# Patient Record
Sex: MALE | Race: WHITE | HISPANIC OR LATINO | Employment: FULL TIME
[De-identification: names, ages, dates, MRNs, and addresses within clinical notes are randomized per-mention and may not be internally consistent; named-entity substitution may affect disease eponyms.]

---

## 2017-01-31 ENCOUNTER — CHARTING TRANS (OUTPATIENT)
Dept: OTHER | Age: 37
End: 2017-01-31

## 2017-02-04 ENCOUNTER — LAB SERVICES (OUTPATIENT)
Dept: OTHER | Age: 37
End: 2017-02-04

## 2017-02-09 ENCOUNTER — CHARTING TRANS (OUTPATIENT)
Dept: OTHER | Age: 37
End: 2017-02-09

## 2017-02-11 ENCOUNTER — CHARTING TRANS (OUTPATIENT)
Dept: OTHER | Age: 37
End: 2017-02-11

## 2017-02-11 LAB
ALBUMIN SERPL-MCNC: 3.7 G/DL (ref 3.6–5.1)
ALBUMIN/GLOB SERPL: 0.9 (ref 1–2.4)
ALP SERPL-CCNC: 92 UNITS/L (ref 45–117)
ALT SERPL-CCNC: 59 UNITS/L
ANION GAP SERPL CALC-SCNC: 12 MMOL/L (ref 10–20)
AST SERPL-CCNC: 36 UNITS/L
BILIRUB SERPL-MCNC: 0.3 MG/DL (ref 0.2–1)
BUN SERPL-MCNC: 12 MG/DL (ref 10–20)
BUN/CREAT SERPL: 14 (ref 7–25)
CALCIUM SERPL-MCNC: 9.2 MG/DL (ref 8.4–10.2)
CHLORIDE SERPL-SCNC: 106 MMOL/L (ref 98–107)
CHOLEST SERPL-MCNC: 142 MG/DL
CHOLEST/HDLC SERPL: 3.8
CO2 SERPL-SCNC: 30 MMOL/L (ref 21–32)
CREAT SERPL-MCNC: 0.84 MG/DL (ref 0.67–1.17)
GLOBULIN SER-MCNC: 4.1 G/DL (ref 2–4)
GLUCOSE SERPL-MCNC: 87 MG/DL (ref 65–99)
HBA1C MFR BLD: 5.5 % (ref 4.5–5.6)
HDLC SERPL-MCNC: 37 MG/DL
LDLC SERPL CALC-MCNC: 80 MG/DL
LENGTH OF FAST TIME PATIENT: ABNORMAL HRS
LENGTH OF FAST TIME PATIENT: ABNORMAL HRS
NONHDLC SERPL-MCNC: 105 MG/DL
POTASSIUM SERPL-SCNC: 4.7 MMOL/L (ref 3.4–5.1)
SODIUM SERPL-SCNC: 143 MMOL/L (ref 135–145)
TOTAL PROTEIN: 7.8 G/DL (ref 6.4–8.2)
TRIGL SERPL-MCNC: 127 MG/DL

## 2017-06-12 ENCOUNTER — LAB SERVICES (OUTPATIENT)
Dept: OTHER | Age: 37
End: 2017-06-12

## 2017-06-12 ENCOUNTER — IMAGING SERVICES (OUTPATIENT)
Dept: OTHER | Age: 37
End: 2017-06-12

## 2017-06-12 ENCOUNTER — HOSPITAL (OUTPATIENT)
Dept: OTHER | Age: 37
End: 2017-06-12
Attending: EMERGENCY MEDICINE

## 2017-06-12 LAB
ALBUMIN SERPL-MCNC: 3.7 G/DL (ref 3.6–5.1)
ALBUMIN SERPL-MCNC: 3.7 GM/DL (ref 3.6–5.1)
ALBUMIN/GLOB SERPL: 0.8 (ref 1–2.4)
ALBUMIN/GLOB SERPL: 0.8 {RATIO} (ref 1–2.4)
ALP SERPL-CCNC: 68 UNIT/L (ref 45–117)
ALP SERPL-CCNC: 68 UNITS/L (ref 45–117)
ALT SERPL-CCNC: 64 UNIT/L
ALT SERPL-CCNC: 64 UNITS/L
ANALYZER ANC (IANC): NORMAL
ANALYZER ANC (IANC): NORMAL
ANION GAP SERPL CALC-SCNC: 11 MMOL/L (ref 10–20)
ANION GAP SERPL CALC-SCNC: 11 MMOL/L (ref 10–20)
AST SERPL-CCNC: 42 UNIT/L
AST SERPL-CCNC: 42 UNITS/L
BASOPHILS # BLD: 0 K/MCL (ref 0–0.3)
BASOPHILS # BLD: 0 THOUSAND/MCL (ref 0–0.3)
BASOPHILS NFR BLD: 0 %
BASOPHILS NFR BLD: 0 %
BILIRUB SERPL-MCNC: 0.4 MG/DL (ref 0.2–1)
BILIRUB SERPL-MCNC: 0.4 MG/DL (ref 0.2–1)
BUN SERPL-MCNC: 12 MG/DL (ref 6–20)
BUN SERPL-MCNC: 12 MG/DL (ref 6–20)
BUN/CREAT SERPL: 17 (ref 7–25)
BUN/CREAT SERPL: 17 (ref 7–25)
CALCIUM SERPL-MCNC: 8.9 MG/DL (ref 8.4–10.2)
CALCIUM SERPL-MCNC: 8.9 MG/DL (ref 8.4–10.2)
CHLORIDE SERPL-SCNC: 104 MMOL/L (ref 98–107)
CHLORIDE: 104 MMOL/L (ref 98–107)
CO2 SERPL-SCNC: 29 MMOL/L (ref 21–32)
CO2 SERPL-SCNC: 29 MMOL/L (ref 21–32)
CREAT SERPL-MCNC: 0.69 MG/DL (ref 0.67–1.17)
CREAT SERPL-MCNC: 0.69 MG/DL (ref 0.67–1.17)
DIFFERENTIAL METHOD BLD: NORMAL
DIFFERENTIAL METHOD BLD: NORMAL
EOSINOPHIL # BLD: 0.1 K/MCL (ref 0.1–0.5)
EOSINOPHIL # BLD: 0.1 THOUSAND/MCL (ref 0.1–0.5)
EOSINOPHIL NFR BLD: 1 %
EOSINOPHIL NFR BLD: 1 %
ERYTHROCYTE [DISTWIDTH] IN BLOOD: 13.3 % (ref 11–15)
ERYTHROCYTE [DISTWIDTH] IN BLOOD: 13.3 % (ref 11–15)
GLOBULIN SER-MCNC: 4.6 G/DL (ref 2–4)
GLOBULIN SER-MCNC: 4.6 GM/DL (ref 2–4)
GLUCOSE SERPL-MCNC: 107 MG/DL (ref 65–99)
GLUCOSE SERPL-MCNC: 107 MG/DL (ref 65–99)
HEMATOCRIT: 44.2 % (ref 39–51)
HEMATOCRIT: 44.2 % (ref 39–51)
HEMOGLOBIN: 15.4 G/DL (ref 13–17)
HGB BLD-MCNC: 15.4 GM/DL (ref 13–17)
LYMPHOCYTES # BLD: 2.3 K/MCL (ref 1–4.8)
LYMPHOCYTES # BLD: 2.3 THOUSAND/MCL (ref 1–4.8)
LYMPHOCYTES NFR BLD: 31 %
LYMPHOCYTES NFR BLD: 31 %
MCH RBC QN AUTO: 29.6 PG (ref 26–34)
MCHC RBC AUTO-ENTMCNC: 34.8 GM/DL (ref 32–36.5)
MCV RBC AUTO: 84.8 FL (ref 78–100)
MEAN CORPUSCULAR HEMOGLOBIN: 29.6 PG (ref 26–34)
MEAN CORPUSCULAR HGB CONC: 34.8 G/DL (ref 32–36.5)
MEAN CORPUSCULAR VOLUME: 84.8 FL (ref 78–100)
MONOCYTES # BLD: 0.6 K/MCL (ref 0.3–0.9)
MONOCYTES # BLD: 0.6 THOUSAND/MCL (ref 0.3–0.9)
MONOCYTES NFR BLD: 8 %
MONOCYTES NFR BLD: 8 %
NEUTROPHILS # BLD: 4.6 K/MCL (ref 1.8–7.7)
NEUTROPHILS # BLD: 4.6 THOUSAND/MCL (ref 1.8–7.7)
NEUTROPHILS NFR BLD: 60 %
NEUTROPHILS NFR BLD: 60 %
NEUTS SEG NFR BLD: NORMAL
NEUTS SEG NFR BLD: NORMAL %
NRBC (NRBCRE): NORMAL
PERCENT NRBC: NORMAL
PLATELET # BLD: 273 THOUSAND/MCL (ref 140–450)
PLATELET COUNT: 273 K/MCL (ref 140–450)
POTASSIUM SERPL-SCNC: 3.7 MMOL/L (ref 3.4–5.1)
POTASSIUM SERPL-SCNC: 3.7 MMOL/L (ref 3.4–5.1)
PROT SERPL-MCNC: 8.3 GM/DL (ref 6.4–8.2)
RBC # BLD: 5.21 MILLION/MCL (ref 4.5–5.9)
RED CELL COUNT: 5.21 MIL/MCL (ref 4.5–5.9)
SODIUM SERPL-SCNC: 140 MMOL/L (ref 135–145)
SODIUM SERPL-SCNC: 140 MMOL/L (ref 135–145)
TOTAL PROTEIN: 8.3 G/DL (ref 6.4–8.2)
TROPONIN I SERPL HS-MCNC: <0.02 NG/ML
TROPONIN I SERPL HS-MCNC: <0.02 NG/ML
WBC # BLD: 7.6 THOUSAND/MCL (ref 4.2–11)
WHITE BLOOD COUNT: 7.6 K/MCL (ref 4.2–11)

## 2017-08-05 ENCOUNTER — IMAGING SERVICES (OUTPATIENT)
Dept: OTHER | Age: 37
End: 2017-08-05

## 2017-08-05 ENCOUNTER — CHARTING TRANS (OUTPATIENT)
Dept: OTHER | Age: 37
End: 2017-08-05

## 2017-09-20 ENCOUNTER — CHARTING TRANS (OUTPATIENT)
Dept: OTHER | Age: 37
End: 2017-09-20

## 2017-09-22 ENCOUNTER — CHARTING TRANS (OUTPATIENT)
Dept: OTHER | Age: 37
End: 2017-09-22

## 2018-02-18 ENCOUNTER — HOSPITAL (OUTPATIENT)
Dept: OTHER | Age: 38
End: 2018-02-18
Attending: EMERGENCY MEDICINE

## 2018-02-18 ENCOUNTER — LAB SERVICES (OUTPATIENT)
Dept: OTHER | Age: 38
End: 2018-02-18

## 2018-02-18 ENCOUNTER — IMAGING SERVICES (OUTPATIENT)
Dept: OTHER | Age: 38
End: 2018-02-18

## 2018-02-18 LAB
ANALYZER ANC (IANC): NORMAL
ANALYZER ANC (IANC): NORMAL
ANION GAP SERPL CALC-SCNC: 13 MMOL/L (ref 10–20)
ANION GAP SERPL CALC-SCNC: 13 MMOL/L (ref 10–20)
BASOPHILS # BLD: 0 K/MCL (ref 0–0.3)
BASOPHILS # BLD: 0 THOUSAND/MCL (ref 0–0.3)
BASOPHILS NFR BLD: 0 %
BASOPHILS NFR BLD: 0 %
BUN SERPL-MCNC: 13 MG/DL (ref 6–20)
BUN SERPL-MCNC: 13 MG/DL (ref 6–20)
BUN/CREAT SERPL: 15 (ref 7–25)
BUN/CREAT SERPL: 15 (ref 7–25)
CALCIUM SERPL-MCNC: 9.5 MG/DL (ref 8.4–10.2)
CALCIUM SERPL-MCNC: 9.5 MG/DL (ref 8.4–10.2)
CHLORIDE SERPL-SCNC: 103 MMOL/L (ref 98–107)
CHLORIDE: 103 MMOL/L (ref 98–107)
CO2 SERPL-SCNC: 27 MMOL/L (ref 21–32)
CO2 SERPL-SCNC: 27 MMOL/L (ref 21–32)
CREAT SERPL-MCNC: 0.86 MG/DL (ref 0.67–1.17)
CREAT SERPL-MCNC: 0.86 MG/DL (ref 0.67–1.17)
DIFFERENTIAL METHOD BLD: NORMAL
DIFFERENTIAL METHOD BLD: NORMAL
EOSINOPHIL # BLD: 0.1 K/MCL (ref 0.1–0.5)
EOSINOPHIL # BLD: 0.1 THOUSAND/MCL (ref 0.1–0.5)
EOSINOPHIL NFR BLD: 1 %
EOSINOPHIL NFR BLD: 1 %
ERYTHROCYTE [DISTWIDTH] IN BLOOD: 13.3 % (ref 11–15)
ERYTHROCYTE [DISTWIDTH] IN BLOOD: 13.3 % (ref 11–15)
GLUCOSE SERPL-MCNC: 112 MG/DL (ref 65–99)
GLUCOSE SERPL-MCNC: 112 MG/DL (ref 65–99)
HEMATOCRIT: 44.9 % (ref 39–51)
HEMATOCRIT: 44.9 % (ref 39–51)
HEMOGLOBIN: 15.8 G/DL (ref 13–17)
HGB BLD-MCNC: 15.8 GM/DL (ref 13–17)
LYMPHOCYTES # BLD: 2 K/MCL (ref 1–4.8)
LYMPHOCYTES # BLD: 2 THOUSAND/MCL (ref 1–4.8)
LYMPHOCYTES NFR BLD: 23 %
LYMPHOCYTES NFR BLD: 23 %
MCH RBC QN AUTO: 29.6 PG (ref 26–34)
MCHC RBC AUTO-ENTMCNC: 35.2 GM/DL (ref 32–36.5)
MCV RBC AUTO: 84.1 FL (ref 78–100)
MEAN CORPUSCULAR HEMOGLOBIN: 29.6 PG (ref 26–34)
MEAN CORPUSCULAR HGB CONC: 35.2 G/DL (ref 32–36.5)
MEAN CORPUSCULAR VOLUME: 84.1 FL (ref 78–100)
MONOCYTES # BLD: 0.6 K/MCL (ref 0.3–0.9)
MONOCYTES # BLD: 0.6 THOUSAND/MCL (ref 0.3–0.9)
MONOCYTES NFR BLD: 6 %
MONOCYTES NFR BLD: 6 %
NEUTROPHILS # BLD: 6.4 K/MCL (ref 1.8–7.7)
NEUTROPHILS # BLD: 6.4 THOUSAND/MCL (ref 1.8–7.7)
NEUTROPHILS NFR BLD: 70 %
NEUTROPHILS NFR BLD: 70 %
NEUTS SEG NFR BLD: NORMAL
NEUTS SEG NFR BLD: NORMAL %
NRBC (NRBCRE): NORMAL
PERCENT NRBC: NORMAL
PLATELET # BLD: 278 THOUSAND/MCL (ref 140–450)
PLATELET COUNT: 278 K/MCL (ref 140–450)
POTASSIUM SERPL-SCNC: 3.4 MMOL/L (ref 3.4–5.1)
POTASSIUM SERPL-SCNC: 3.4 MMOL/L (ref 3.4–5.1)
RBC # BLD: 5.34 MILLION/MCL (ref 4.5–5.9)
RED CELL COUNT: 5.34 MIL/MCL (ref 4.5–5.9)
SODIUM SERPL-SCNC: 140 MMOL/L (ref 135–145)
SODIUM SERPL-SCNC: 140 MMOL/L (ref 135–145)
TROPONIN I SERPL HS-MCNC: <0.02 NG/ML
TROPONIN I SERPL HS-MCNC: <0.02 NG/ML
WBC # BLD: 9.1 THOUSAND/MCL (ref 4.2–11)
WHITE BLOOD COUNT: 9.1 K/MCL (ref 4.2–11)

## 2018-02-19 ENCOUNTER — CHARTING TRANS (OUTPATIENT)
Dept: OTHER | Age: 38
End: 2018-02-19

## 2018-02-27 ENCOUNTER — CHARTING TRANS (OUTPATIENT)
Dept: OTHER | Age: 38
End: 2018-02-27

## 2018-02-27 ENCOUNTER — LAB SERVICES (OUTPATIENT)
Dept: OTHER | Age: 38
End: 2018-02-27

## 2018-03-17 LAB
ALBUMIN SERPL-MCNC: 4.3 G/DL (ref 3.6–5.1)
ALP SERPL-CCNC: 77 UNITS/L (ref 45–117)
ALT SERPL-CCNC: 71 UNITS/L
AST SERPL-CCNC: 39 UNITS/L
BILIRUB CONJ SERPL-MCNC: 0.2 MG/DL (ref 0–0.2)
BILIRUB SERPL-MCNC: 0.5 MG/DL (ref 0.2–1)
CHOLEST SERPL-MCNC: 144 MG/DL
CHOLEST/HDLC SERPL: 4.1
HDLC SERPL-MCNC: 35 MG/DL
LDLC SERPL CALC-MCNC: 69 MG/DL
LENGTH OF FAST TIME PATIENT: ABNORMAL HRS
NONHDLC SERPL-MCNC: 109 MG/DL
TOTAL PROTEIN: 8.3 G/DL (ref 6.4–8.2)
TRIGL SERPL-MCNC: 199 MG/DL

## 2018-03-24 ENCOUNTER — CHARTING TRANS (OUTPATIENT)
Dept: OTHER | Age: 38
End: 2018-03-24

## 2018-07-09 ENCOUNTER — CHARTING TRANS (OUTPATIENT)
Dept: OTHER | Age: 38
End: 2018-07-09

## 2018-07-09 ENCOUNTER — LAB SERVICES (OUTPATIENT)
Dept: OTHER | Age: 38
End: 2018-07-09

## 2018-07-09 ENCOUNTER — HOSPITAL (OUTPATIENT)
Dept: OTHER | Age: 38
End: 2018-07-09
Attending: INTERNAL MEDICINE

## 2018-07-09 ENCOUNTER — IMAGING SERVICES (OUTPATIENT)
Dept: OTHER | Age: 38
End: 2018-07-09

## 2018-07-09 LAB
ALBUMIN SERPL-MCNC: 3.5 G/DL (ref 3.6–5.1)
ALBUMIN SERPL-MCNC: 3.5 GM/DL (ref 3.6–5.1)
ALBUMIN/GLOB SERPL: 0.7 (ref 1–2.4)
ALBUMIN/GLOB SERPL: 0.7 {RATIO} (ref 1–2.4)
ALP SERPL-CCNC: 70 UNIT/L (ref 45–117)
ALP SERPL-CCNC: 70 UNITS/L (ref 45–117)
ALT SERPL-CCNC: 39 UNIT/L
ALT SERPL-CCNC: 39 UNITS/L
AMORPH SED URNS QL MICRO: NORMAL
AMORPH SED URNS QL MICRO: NORMAL
ANALYZER ANC (IANC): ABNORMAL
ANALYZER ANC (IANC): ABNORMAL
ANION GAP SERPL CALC-SCNC: 13 MMOL/L (ref 10–20)
ANION GAP SERPL CALC-SCNC: 13 MMOL/L (ref 10–20)
APPEARANCE UR: CLEAR
AST SERPL-CCNC: 23 UNIT/L
AST SERPL-CCNC: 23 UNITS/L
BASOPHILS # BLD: 0 K/MCL (ref 0–0.3)
BASOPHILS # BLD: 0 THOUSAND/MCL (ref 0–0.3)
BASOPHILS NFR BLD: 0 %
BASOPHILS NFR BLD: 0 %
BILIRUB SERPL-MCNC: 0.5 MG/DL (ref 0.2–1)
BILIRUB SERPL-MCNC: 0.5 MG/DL (ref 0.2–1)
BILIRUB UR QL STRIP: NEGATIVE
BILIRUB UR QL STRIP: NEGATIVE
BILIRUB UR QL: NEGATIVE
BILIRUB UR QL: NEGATIVE
BUN SERPL-MCNC: 10 MG/DL (ref 6–20)
BUN SERPL-MCNC: 10 MG/DL (ref 6–20)
BUN/CREAT SERPL: 12 (ref 7–25)
BUN/CREAT SERPL: 12 (ref 7–25)
CALCIUM SERPL-MCNC: 9 MG/DL (ref 8.4–10.2)
CALCIUM SERPL-MCNC: 9 MG/DL (ref 8.4–10.2)
CAOX CRY URNS QL MICRO: NORMAL
CAOX CRY URNS QL MICRO: NORMAL
CHLORIDE SERPL-SCNC: 101 MMOL/L (ref 98–107)
CHLORIDE: 101 MMOL/L (ref 98–107)
CO2 SERPL-SCNC: 25 MMOL/L (ref 21–32)
CO2 SERPL-SCNC: 25 MMOL/L (ref 21–32)
COLOR UR: YELLOW
CREAT SERPL-MCNC: 0.85 MG/DL (ref 0.67–1.17)
CREAT SERPL-MCNC: 0.85 MG/DL (ref 0.67–1.17)
DIFFERENTIAL METHOD BLD: ABNORMAL
DIFFERENTIAL METHOD BLD: ABNORMAL
EOSINOPHIL # BLD: 0.2 K/MCL (ref 0.1–0.5)
EOSINOPHIL # BLD: 0.2 THOUSAND/MCL (ref 0.1–0.5)
EOSINOPHIL NFR BLD: 1 %
EOSINOPHIL NFR BLD: 1 %
EPITH CASTS #/AREA URNS LPF: NORMAL
EPITH CASTS #/AREA URNS LPF: NORMAL /[LPF]
ERYTHROCYTE [DISTWIDTH] IN BLOOD: 12.6 % (ref 11–15)
ERYTHROCYTE [DISTWIDTH] IN BLOOD: 12.6 % (ref 11–15)
FATTY CASTS #/AREA URNS LPF: NORMAL
FATTY CASTS #/AREA URNS LPF: NORMAL /[LPF]
GLOBULIN SER-MCNC: 4.8 G/DL (ref 2–4)
GLOBULIN SER-MCNC: 4.8 GM/DL (ref 2–4)
GLUCOSE SERPL-MCNC: 98 MG/DL (ref 65–99)
GLUCOSE SERPL-MCNC: 98 MG/DL (ref 65–99)
GLUCOSE UR STRIP-MCNC: NEGATIVE MG/DL
GLUCOSE UR STRIP-MCNC: NEGATIVE MG/DL
GLUCOSE UR-MCNC: NEGATIVE MG/DL
GLUCOSE UR-MCNC: NEGATIVE MG/DL
GRAN CASTS #/AREA URNS LPF: NORMAL
GRAN CASTS #/AREA URNS LPF: NORMAL /[LPF]
HEMATOCRIT: 41.4 % (ref 39–51)
HEMATOCRIT: 41.4 % (ref 39–51)
HEMOCCULT STL QL: NEGATIVE
HEMOCCULT STL QL: NEGATIVE
HEMOGLOBIN: 14.6 G/DL (ref 13–17)
HGB BLD-MCNC: 14.6 GM/DL (ref 13–17)
HGB UR QL: NEGATIVE
HYALINE CASTS #/AREA URNS LPF: NORMAL
HYALINE CASTS #/AREA URNS LPF: NORMAL /[LPF]
KETONES UR STRIP-MCNC: NEGATIVE MG/DL
KETONES UR STRIP-MCNC: NEGATIVE MG/DL
KETONES UR-MCNC: NEGATIVE MG/DL
KETONES UR-MCNC: NEGATIVE MG/DL
LEUKOCYTE ESTERASE UR QL STRIP: NEGATIVE
LIPASE SERPL-CCNC: 83 UNIT/L (ref 73–393)
LIPASE SERPL-CCNC: 83 UNITS/L (ref 73–393)
LYMPHOCYTES # BLD: 2.4 K/MCL (ref 1–4.8)
LYMPHOCYTES # BLD: 2.4 THOUSAND/MCL (ref 1–4.8)
LYMPHOCYTES NFR BLD: 19 %
LYMPHOCYTES NFR BLD: 19 %
MCH RBC QN AUTO: 29.8 PG (ref 26–34)
MCHC RBC AUTO-ENTMCNC: 35.3 GM/DL (ref 32–36.5)
MCV RBC AUTO: 84.5 FL (ref 78–100)
MEAN CORPUSCULAR HEMOGLOBIN: 29.8 PG (ref 26–34)
MEAN CORPUSCULAR HGB CONC: 35.3 G/DL (ref 32–36.5)
MEAN CORPUSCULAR VOLUME: 84.5 FL (ref 78–100)
MICROSCOPIC (MT): NORMAL
MICROSCOPIC (MT): NORMAL
MIXED CELL CASTS #/AREA URNS LPF: NORMAL
MIXED CELL CASTS #/AREA URNS LPF: NORMAL /[LPF]
MONOCYTES # BLD: 1.3 K/MCL (ref 0.3–0.9)
MONOCYTES # BLD: 1.3 THOUSAND/MCL (ref 0.3–0.9)
MONOCYTES NFR BLD: 11 %
MONOCYTES NFR BLD: 11 %
MUCOUS THREADS URNS QL MICRO: NORMAL
MUCOUS THREADS URNS QL MICRO: NORMAL
NEUTROPHILS # BLD: 8.6 K/MCL (ref 1.8–7.7)
NEUTROPHILS # BLD: 8.6 THOUSAND/MCL (ref 1.8–7.7)
NEUTROPHILS NFR BLD: 69 %
NEUTROPHILS NFR BLD: 69 %
NEUTS SEG NFR BLD: ABNORMAL
NEUTS SEG NFR BLD: ABNORMAL %
NITRITE UR QL STRIP: NEGATIVE
NITRITE UR QL STRIP: NEGATIVE
NITRITE UR QL: NEGATIVE
NITRITE UR QL: NEGATIVE
NRBC (NRBCRE): ABNORMAL
NRBC (NRBCRE): ABNORMAL
PH UR STRIP: 7 UNIT (ref 5–7)
PH UR STRIP: 7 UNITS (ref 5–7)
PH UR: 6 UNIT (ref 5–7)
PH UR: 6 UNITS (ref 5–7)
PLATELET # BLD: 325 THOUSAND/MCL (ref 140–450)
PLATELET COUNT: 325 K/MCL (ref 140–450)
POTASSIUM SERPL-SCNC: 3.8 MMOL/L (ref 3.4–5.1)
POTASSIUM SERPL-SCNC: 3.8 MMOL/L (ref 3.4–5.1)
PROT SERPL-MCNC: 8.3 GM/DL (ref 6.4–8.2)
PROT UR QL: NEGATIVE MG/DL
PROT UR QL: NEGATIVE MG/DL
PROT UR STRIP-MCNC: NEGATIVE MG/DL
PROT UR STRIP-MCNC: NEGATIVE MG/DL
RBC # BLD: 4.9 MILLION/MCL (ref 4.5–5.9)
RBC CASTS #/AREA URNS LPF: NORMAL
RBC CASTS #/AREA URNS LPF: NORMAL /[LPF]
RBC-URINE: NEGATIVE
RED CELL COUNT: 4.9 MIL/MCL (ref 4.5–5.9)
RENAL EPI CELLS #/AREA URNS HPF: NORMAL
RENAL EPI CELLS #/AREA URNS HPF: NORMAL /[HPF]
SODIUM SERPL-SCNC: 135 MMOL/L (ref 135–145)
SODIUM SERPL-SCNC: 135 MMOL/L (ref 135–145)
SP GR UR STRIP: 1.02 (ref 1–1.03)
SP GR UR STRIP: 1.02 (ref 1–1.03)
SP GR UR: 1.02 (ref 1–1.03)
SP GR UR: 1.02 (ref 1–1.03)
SPECIMEN SOURCE: NORMAL
SPECIMEN SOURCE: NORMAL
SPERM URNS QL MICRO: NORMAL
SPERM URNS QL MICRO: NORMAL
T VAGINALIS URNS QL MICRO: NORMAL
T VAGINALIS URNS QL MICRO: NORMAL
TOTAL PROTEIN: 8.3 G/DL (ref 6.4–8.2)
TRI-PHOS CRY URNS QL MICRO: NORMAL
TRI-PHOS CRY URNS QL MICRO: NORMAL
URATE CRY URNS QL MICRO: NORMAL
URATE CRY URNS QL MICRO: NORMAL
URNS CMNT MICRO: NORMAL
URNS CMNT MICRO: NORMAL
UROBILINOGEN UR QL: 0.2 MG/DL (ref 0–1)
UROBILINOGEN UR QL: 0.2 MG/DL (ref 0–1)
UROBILINOGEN UR STRIP-MCNC: 1 MG/DL (ref 0–1)
UROBILINOGEN UR STRIP-MCNC: 1 MG/DL (ref 0–1)
WAXY CASTS #/AREA URNS LPF: NORMAL
WAXY CASTS #/AREA URNS LPF: NORMAL /[LPF]
WBC # BLD: 12.5 THOUSAND/MCL (ref 4.2–11)
WBC CASTS #/AREA URNS LPF: NORMAL
WBC CASTS #/AREA URNS LPF: NORMAL /[LPF]
WBC-URINE: NEGATIVE
WHITE BLOOD COUNT: 12.5 K/MCL (ref 4.2–11)
YEAST HYPHAE URNS QL MICRO: NORMAL
YEAST HYPHAE URNS QL MICRO: NORMAL
YEAST URNS QL MICRO: NORMAL
YEAST URNS QL MICRO: NORMAL

## 2018-07-10 LAB
ANALYZER ANC (IANC): ABNORMAL
APTT PPP: 30 SECONDS (ref 22–30)
APTT PPP: NORMAL S
BASOPHILS # BLD: 0 THOUSAND/MCL (ref 0–0.3)
BASOPHILS NFR BLD: 0 %
CRP SERPL-MCNC: 8.3 MG/DL
DIFFERENTIAL METHOD BLD: ABNORMAL
EOSINOPHIL # BLD: 0.2 THOUSAND/MCL (ref 0.1–0.5)
EOSINOPHIL NFR BLD: 2 %
ERYTHROCYTE [DISTWIDTH] IN BLOOD: 12.8 % (ref 11–15)
HEMATOCRIT: 39.7 % (ref 39–51)
HGB BLD-MCNC: 13.7 GM/DL (ref 13–17)
INR PPP: 1.1
LYMPHOCYTES # BLD: 1.9 THOUSAND/MCL (ref 1–4.8)
LYMPHOCYTES NFR BLD: 19 %
MCH RBC QN AUTO: 29.6 PG (ref 26–34)
MCHC RBC AUTO-ENTMCNC: 34.5 GM/DL (ref 32–36.5)
MCV RBC AUTO: 85.7 FL (ref 78–100)
MONOCYTES # BLD: 1 THOUSAND/MCL (ref 0.3–0.9)
MONOCYTES NFR BLD: 10 %
NEUTROPHILS # BLD: 6.7 THOUSAND/MCL (ref 1.8–7.7)
NEUTROPHILS NFR BLD: 69 %
NEUTS SEG NFR BLD: ABNORMAL %
NRBC (NRBCRE): ABNORMAL
PLATELET # BLD: 283 THOUSAND/MCL (ref 140–450)
PROTHROMBIN TIME: 11.4 SECONDS (ref 9.7–11.8)
PROTHROMBIN TIME: NORMAL
RBC # BLD: 4.63 MILLION/MCL (ref 4.5–5.9)
WBC # BLD: 9.7 THOUSAND/MCL (ref 4.2–11)

## 2018-07-11 LAB
ANALYZER ANC (IANC): NORMAL
ANION GAP SERPL CALC-SCNC: 13 MMOL/L (ref 10–20)
BASOPHILS # BLD: 0 THOUSAND/MCL (ref 0–0.3)
BASOPHILS NFR BLD: 0 %
BUN SERPL-MCNC: 9 MG/DL (ref 6–20)
BUN/CREAT SERPL: 11 (ref 7–25)
CALCIUM SERPL-MCNC: 8.6 MG/DL (ref 8.4–10.2)
CHLORIDE: 111 MMOL/L (ref 98–107)
CO2 SERPL-SCNC: 27 MMOL/L (ref 21–32)
CREAT SERPL-MCNC: 0.85 MG/DL (ref 0.67–1.17)
CRP SERPL-MCNC: 7.7 MG/DL
DIFFERENTIAL METHOD BLD: NORMAL
EOSINOPHIL # BLD: 0.2 THOUSAND/MCL (ref 0.1–0.5)
EOSINOPHIL NFR BLD: 2 %
ERYTHROCYTE [DISTWIDTH] IN BLOOD: 12.7 % (ref 11–15)
GLUCOSE SERPL-MCNC: 101 MG/DL (ref 65–99)
HEMATOCRIT: 40.3 % (ref 39–51)
HGB BLD-MCNC: 14 GM/DL (ref 13–17)
LYMPHOCYTES # BLD: 2 THOUSAND/MCL (ref 1–4.8)
LYMPHOCYTES NFR BLD: 24 %
MAGNESIUM SERPL-MCNC: 2.1 MG/DL (ref 1.7–2.4)
MCH RBC QN AUTO: 29.4 PG (ref 26–34)
MCHC RBC AUTO-ENTMCNC: 34.7 GM/DL (ref 32–36.5)
MCV RBC AUTO: 84.7 FL (ref 78–100)
MONOCYTES # BLD: 0.8 THOUSAND/MCL (ref 0.3–0.9)
MONOCYTES NFR BLD: 10 %
NEUTROPHILS # BLD: 5.4 THOUSAND/MCL (ref 1.8–7.7)
NEUTROPHILS NFR BLD: 64 %
NEUTS SEG NFR BLD: NORMAL %
NRBC (NRBCRE): NORMAL
PHOSPHATE SERPL-MCNC: 3.9 MG/DL (ref 2.4–4.7)
PLATELET # BLD: 284 THOUSAND/MCL (ref 140–450)
POTASSIUM SERPL-SCNC: 4 MMOL/L (ref 3.4–5.1)
RBC # BLD: 4.76 MILLION/MCL (ref 4.5–5.9)
SODIUM SERPL-SCNC: 147 MMOL/L (ref 135–145)
WBC # BLD: 8.5 THOUSAND/MCL (ref 4.2–11)

## 2018-07-13 ENCOUNTER — CHARTING TRANS (OUTPATIENT)
Dept: OTHER | Age: 38
End: 2018-07-13

## 2018-07-16 ENCOUNTER — CHARTING TRANS (OUTPATIENT)
Dept: OTHER | Age: 38
End: 2018-07-16

## 2018-07-27 ENCOUNTER — CHARTING TRANS (OUTPATIENT)
Dept: OTHER | Age: 38
End: 2018-07-27

## 2018-07-27 ENCOUNTER — HOSPITAL (OUTPATIENT)
Dept: OTHER | Age: 38
End: 2018-07-27
Attending: SURGERY

## 2018-08-18 ENCOUNTER — IMAGING SERVICES (OUTPATIENT)
Dept: OTHER | Age: 38
End: 2018-08-18

## 2018-08-18 ENCOUNTER — HOSPITAL (OUTPATIENT)
Dept: OTHER | Age: 38
End: 2018-08-18

## 2018-09-07 ENCOUNTER — CHARTING TRANS (OUTPATIENT)
Dept: OTHER | Age: 38
End: 2018-09-07

## 2018-09-09 ENCOUNTER — CHARTING TRANS (OUTPATIENT)
Dept: OTHER | Age: 38
End: 2018-09-09

## 2018-09-10 ENCOUNTER — HOSPITAL (OUTPATIENT)
Dept: OTHER | Age: 38
End: 2018-09-10
Attending: SURGERY

## 2018-09-17 ENCOUNTER — CHARTING TRANS (OUTPATIENT)
Dept: OTHER | Age: 38
End: 2018-09-17

## 2018-09-17 ASSESSMENT — PAIN SCALES - GENERAL: PAINLEVEL_OUTOF10: 8

## 2018-09-18 ENCOUNTER — CHARTING TRANS (OUTPATIENT)
Dept: OTHER | Age: 38
End: 2018-09-18

## 2018-09-18 ENCOUNTER — HOSPITAL (OUTPATIENT)
Dept: OTHER | Age: 38
End: 2018-09-18
Attending: SURGERY

## 2018-10-31 VITALS
BODY MASS INDEX: 47.74 KG/M2 | WEIGHT: 315 LBS | RESPIRATION RATE: 20 BRPM | OXYGEN SATURATION: 96 % | HEIGHT: 68 IN | HEART RATE: 75 BPM | TEMPERATURE: 96.9 F

## 2018-10-31 VITALS
TEMPERATURE: 98.1 F | BODY MASS INDEX: 47.74 KG/M2 | OXYGEN SATURATION: 99 % | SYSTOLIC BLOOD PRESSURE: 115 MMHG | HEIGHT: 68 IN | WEIGHT: 315 LBS | DIASTOLIC BLOOD PRESSURE: 76 MMHG | HEART RATE: 65 BPM | RESPIRATION RATE: 18 BRPM

## 2018-11-01 VITALS
TEMPERATURE: 96.7 F | OXYGEN SATURATION: 97 % | HEIGHT: 68 IN | WEIGHT: 315 LBS | BODY MASS INDEX: 47.74 KG/M2 | HEART RATE: 60 BPM | RESPIRATION RATE: 19 BRPM

## 2018-11-01 VITALS
RESPIRATION RATE: 18 BRPM | HEART RATE: 68 BPM | TEMPERATURE: 97.6 F | BODY MASS INDEX: 47.74 KG/M2 | HEIGHT: 68 IN | WEIGHT: 315 LBS | OXYGEN SATURATION: 98 %

## 2018-11-02 VITALS
BODY MASS INDEX: 47.74 KG/M2 | RESPIRATION RATE: 20 BRPM | HEART RATE: 75 BPM | HEIGHT: 68 IN | OXYGEN SATURATION: 96 % | TEMPERATURE: 96 F | WEIGHT: 315 LBS

## 2018-11-03 VITALS
BODY MASS INDEX: 47.74 KG/M2 | HEART RATE: 68 BPM | SYSTOLIC BLOOD PRESSURE: 104 MMHG | OXYGEN SATURATION: 96 % | WEIGHT: 315 LBS | DIASTOLIC BLOOD PRESSURE: 66 MMHG | TEMPERATURE: 99.1 F | HEIGHT: 68 IN | RESPIRATION RATE: 20 BRPM

## 2018-11-05 VITALS
BODY MASS INDEX: 47.74 KG/M2 | TEMPERATURE: 96.5 F | RESPIRATION RATE: 20 BRPM | HEIGHT: 68 IN | WEIGHT: 315 LBS | OXYGEN SATURATION: 97 % | HEART RATE: 65 BPM

## 2018-11-05 VITALS
WEIGHT: 315 LBS | RESPIRATION RATE: 18 BRPM | OXYGEN SATURATION: 99 % | HEIGHT: 68 IN | HEART RATE: 65 BPM | TEMPERATURE: 97 F | BODY MASS INDEX: 47.74 KG/M2

## 2018-11-27 VITALS
SYSTOLIC BLOOD PRESSURE: 102 MMHG | HEART RATE: 57 BPM | RESPIRATION RATE: 17 BRPM | BODY MASS INDEX: 46.83 KG/M2 | DIASTOLIC BLOOD PRESSURE: 62 MMHG | WEIGHT: 309 LBS | HEIGHT: 68 IN | OXYGEN SATURATION: 96 % | TEMPERATURE: 98.2 F

## 2018-11-27 VITALS
OXYGEN SATURATION: 99 % | SYSTOLIC BLOOD PRESSURE: 124 MMHG | BODY MASS INDEX: 47.29 KG/M2 | DIASTOLIC BLOOD PRESSURE: 66 MMHG | HEART RATE: 75 BPM | TEMPERATURE: 96.7 F | WEIGHT: 311 LBS | RESPIRATION RATE: 18 BRPM

## 2019-03-15 ENCOUNTER — HOSPITAL (OUTPATIENT)
Dept: OTHER | Age: 39
End: 2019-03-15
Attending: SURGERY

## 2019-04-12 ENCOUNTER — TELEPHONE (OUTPATIENT)
Dept: SCHEDULING | Age: 39
End: 2019-04-12

## 2019-04-17 ENCOUNTER — TELEPHONE (OUTPATIENT)
Dept: SCHEDULING | Age: 39
End: 2019-04-17

## 2019-04-24 ENCOUNTER — OFFICE VISIT (OUTPATIENT)
Dept: INTERNAL MEDICINE | Age: 39
End: 2019-04-24

## 2019-04-24 ENCOUNTER — LAB SERVICES (OUTPATIENT)
Dept: LAB | Age: 39
End: 2019-04-24

## 2019-04-24 VITALS
SYSTOLIC BLOOD PRESSURE: 117 MMHG | TEMPERATURE: 97.3 F | RESPIRATION RATE: 18 BRPM | WEIGHT: 315 LBS | DIASTOLIC BLOOD PRESSURE: 79 MMHG | HEIGHT: 68 IN | OXYGEN SATURATION: 96 % | HEART RATE: 74 BPM | BODY MASS INDEX: 47.74 KG/M2

## 2019-04-24 DIAGNOSIS — Z00.00 ANNUAL PHYSICAL EXAM: Primary | ICD-10-CM

## 2019-04-24 DIAGNOSIS — B35.6 TINEA CRURIS: ICD-10-CM

## 2019-04-24 DIAGNOSIS — Z00.00 ANNUAL PHYSICAL EXAM: ICD-10-CM

## 2019-04-24 DIAGNOSIS — E66.01 MORBID OBESITY (CMD): ICD-10-CM

## 2019-04-24 LAB
ALBUMIN SERPL-MCNC: 3.8 G/DL (ref 3.6–5.1)
ALBUMIN/GLOB SERPL: 0.9 {RATIO} (ref 1–2.4)
ALP SERPL-CCNC: 75 UNITS/L (ref 45–117)
ALT SERPL-CCNC: 73 UNITS/L
ANION GAP SERPL CALC-SCNC: 11 MMOL/L (ref 10–20)
AST SERPL-CCNC: 39 UNITS/L
BILIRUB SERPL-MCNC: 0.4 MG/DL (ref 0.2–1)
BUN SERPL-MCNC: 8 MG/DL (ref 6–20)
BUN/CREAT SERPL: 10 (ref 7–25)
CALCIUM SERPL-MCNC: 9.3 MG/DL (ref 8.4–10.2)
CHLORIDE SERPL-SCNC: 106 MMOL/L (ref 98–107)
CHOLEST SERPL-MCNC: 159 MG/DL
CHOLEST/HDLC SERPL: 4.5 {RATIO}
CO2 SERPL-SCNC: 28 MMOL/L (ref 21–32)
CREAT SERPL-MCNC: 0.78 MG/DL (ref 0.67–1.17)
FASTING STATUS PATIENT QL REPORTED: 12 HRS
GLOBULIN SER-MCNC: 4.1 G/DL (ref 2–4)
GLUCOSE SERPL-MCNC: 92 MG/DL (ref 65–99)
HDLC SERPL-MCNC: 35 MG/DL
LDLC SERPL-MCNC: 82 MG/DL
LENGTH OF FAST TIME PATIENT: 12 HRS
NONHDLC SERPL-MCNC: 124 MG/DL
POTASSIUM SERPL-SCNC: 4.4 MMOL/L (ref 3.4–5.1)
PROT SERPL-MCNC: 7.9 G/DL (ref 6.4–8.2)
SODIUM SERPL-SCNC: 141 MMOL/L (ref 135–145)
TRIGL SERPL-MCNC: 212 MG/DL

## 2019-04-24 PROCEDURE — 99395 PREV VISIT EST AGE 18-39: CPT | Performed by: INTERNAL MEDICINE

## 2019-04-24 PROCEDURE — 80053 COMPREHEN METABOLIC PANEL: CPT | Performed by: INTERNAL MEDICINE

## 2019-04-24 PROCEDURE — 36415 COLL VENOUS BLD VENIPUNCTURE: CPT | Performed by: INTERNAL MEDICINE

## 2019-04-24 PROCEDURE — 80061 LIPID PANEL: CPT | Performed by: INTERNAL MEDICINE

## 2019-04-24 RX ORDER — CLOTRIMAZOLE AND BETAMETHASONE DIPROPIONATE 10; .64 MG/G; MG/G
CREAM TOPICAL 2 TIMES DAILY
Qty: 30 G | Refills: 0 | Status: SHIPPED | OUTPATIENT
Start: 2019-04-24 | End: 2022-09-13

## 2019-04-24 ASSESSMENT — PATIENT HEALTH QUESTIONNAIRE - PHQ9
1. LITTLE INTEREST OR PLEASURE IN DOING THINGS: NOT AT ALL
SUM OF ALL RESPONSES TO PHQ9 QUESTIONS 1 AND 2: 0
2. FEELING DOWN, DEPRESSED OR HOPELESS: NOT AT ALL
SUM OF ALL RESPONSES TO PHQ9 QUESTIONS 1 AND 2: 0

## 2019-05-06 ENCOUNTER — TELEPHONE (OUTPATIENT)
Dept: SCHEDULING | Age: 39
End: 2019-05-06

## 2019-05-09 ENCOUNTER — TELEPHONE (OUTPATIENT)
Dept: SCHEDULING | Age: 39
End: 2019-05-09

## 2019-05-09 ENCOUNTER — APPOINTMENT (OUTPATIENT)
Dept: GENERAL RADIOLOGY | Facility: HOSPITAL | Age: 39
End: 2019-05-09
Attending: EMERGENCY MEDICINE
Payer: COMMERCIAL

## 2019-05-09 ENCOUNTER — HOSPITAL ENCOUNTER (EMERGENCY)
Facility: HOSPITAL | Age: 39
Discharge: HOME | End: 2019-05-09
Attending: EMERGENCY MEDICINE
Payer: COMMERCIAL

## 2019-05-09 ENCOUNTER — APPOINTMENT (OUTPATIENT)
Dept: CT IMAGING | Facility: HOSPITAL | Age: 39
End: 2019-05-09
Attending: EMERGENCY MEDICINE
Payer: COMMERCIAL

## 2019-05-09 ENCOUNTER — APPOINTMENT (OUTPATIENT)
Dept: MRI IMAGING | Facility: HOSPITAL | Age: 39
End: 2019-05-09
Attending: EMERGENCY MEDICINE
Payer: COMMERCIAL

## 2019-05-09 VITALS
RESPIRATION RATE: 18 BRPM | TEMPERATURE: 98 F | BODY MASS INDEX: 47.74 KG/M2 | SYSTOLIC BLOOD PRESSURE: 115 MMHG | HEART RATE: 66 BPM | DIASTOLIC BLOOD PRESSURE: 67 MMHG | HEIGHT: 68 IN | OXYGEN SATURATION: 97 % | WEIGHT: 315 LBS

## 2019-05-09 DIAGNOSIS — R55 SYNCOPE, NEAR: Primary | ICD-10-CM

## 2019-05-09 DIAGNOSIS — R51.9 ACUTE NONINTRACTABLE HEADACHE, UNSPECIFIED HEADACHE TYPE: ICD-10-CM

## 2019-05-09 DIAGNOSIS — R07.89 CHEST PAIN, ATYPICAL: ICD-10-CM

## 2019-05-09 PROCEDURE — 99285 EMERGENCY DEPT VISIT HI MDM: CPT

## 2019-05-09 PROCEDURE — A9575 INJ GADOTERATE MEGLUMI 0.1ML: HCPCS | Performed by: EMERGENCY MEDICINE

## 2019-05-09 PROCEDURE — 80048 BASIC METABOLIC PNL TOTAL CA: CPT | Performed by: EMERGENCY MEDICINE

## 2019-05-09 PROCEDURE — 93010 ELECTROCARDIOGRAM REPORT: CPT | Performed by: EMERGENCY MEDICINE

## 2019-05-09 PROCEDURE — 96360 HYDRATION IV INFUSION INIT: CPT

## 2019-05-09 PROCEDURE — 70551 MRI BRAIN STEM W/O DYE: CPT | Performed by: EMERGENCY MEDICINE

## 2019-05-09 PROCEDURE — 71045 X-RAY EXAM CHEST 1 VIEW: CPT | Performed by: EMERGENCY MEDICINE

## 2019-05-09 PROCEDURE — 70549 MR ANGIOGRAPH NECK W/O&W/DYE: CPT | Performed by: EMERGENCY MEDICINE

## 2019-05-09 PROCEDURE — 85025 COMPLETE CBC W/AUTO DIFF WBC: CPT | Performed by: EMERGENCY MEDICINE

## 2019-05-09 PROCEDURE — 84484 ASSAY OF TROPONIN QUANT: CPT | Performed by: EMERGENCY MEDICINE

## 2019-05-09 PROCEDURE — 85379 FIBRIN DEGRADATION QUANT: CPT | Performed by: EMERGENCY MEDICINE

## 2019-05-09 PROCEDURE — 82962 GLUCOSE BLOOD TEST: CPT

## 2019-05-09 PROCEDURE — 93005 ELECTROCARDIOGRAM TRACING: CPT

## 2019-05-09 PROCEDURE — 70450 CT HEAD/BRAIN W/O DYE: CPT | Performed by: EMERGENCY MEDICINE

## 2019-05-09 NOTE — ED INITIAL ASSESSMENT (HPI)
Pt to ER with c/o dizziness and headache that has worsened since last night. Pt denies visual changes. Pt denies medical hx and denies home medications. Pt denies syncopal episode.  Pt states while a work he had a \"brief\" period of bilateral upper chest d

## 2019-05-09 NOTE — ED PROVIDER NOTES
Patient Seen in: [facility] AND [facility] Emergency Department    History   Patient presents with:  Dizziness (neurologic)  Headache (neurologic)    Stated Complaint: dizziness, headache    HPI    Patient presents the emergency department complaining of dizzine Cardiovascular: Normal rate and regular rhythm. No murmur heard. Pulmonary/Chest: Effort normal and breath sounds normal. No respiratory distress. Abdominal: Soft. He exhibits no distension. There is no tenderness.    Musculoskeletal: Normal range of (CST): Adilia Thomas MD on 5/09/2019 at 16:54          Mra Carotids (w+wo) (cpt=70549)    Result Date: 5/9/2019  CONCLUSION:  1. No hemodynamically significant stenosis and/or dissection involving the cervical carotid or vertebral arteries.  2. Diminutive are no discharge medications for this patient.

## 2019-05-13 ENCOUNTER — OFFICE VISIT (OUTPATIENT)
Dept: INTERNAL MEDICINE | Age: 39
End: 2019-05-13

## 2019-05-13 ENCOUNTER — TELEPHONE (OUTPATIENT)
Dept: SCHEDULING | Age: 39
End: 2019-05-13

## 2019-05-13 VITALS
HEIGHT: 68 IN | WEIGHT: 315 LBS | SYSTOLIC BLOOD PRESSURE: 122 MMHG | HEART RATE: 78 BPM | OXYGEN SATURATION: 97 % | RESPIRATION RATE: 15 BRPM | BODY MASS INDEX: 47.74 KG/M2 | TEMPERATURE: 97.7 F | DIASTOLIC BLOOD PRESSURE: 70 MMHG

## 2019-05-13 DIAGNOSIS — R55 NEAR SYNCOPE: ICD-10-CM

## 2019-05-13 DIAGNOSIS — E66.01 MORBID OBESITY (CMD): Primary | ICD-10-CM

## 2019-05-13 PROCEDURE — 99213 OFFICE O/P EST LOW 20 MIN: CPT | Performed by: INTERNAL MEDICINE

## 2019-05-13 ASSESSMENT — PATIENT HEALTH QUESTIONNAIRE - PHQ9
2. FEELING DOWN, DEPRESSED OR HOPELESS: NOT AT ALL
SUM OF ALL RESPONSES TO PHQ9 QUESTIONS 1 AND 2: 0
SUM OF ALL RESPONSES TO PHQ9 QUESTIONS 1 AND 2: 0
1. LITTLE INTEREST OR PLEASURE IN DOING THINGS: NOT AT ALL

## 2019-05-13 ASSESSMENT — ENCOUNTER SYMPTOMS
EYES NEGATIVE: 1
FATIGUE: 1
CONSTIPATION: 1
HEADACHES: 1
NAUSEA: 1
PSYCHIATRIC NEGATIVE: 1

## 2019-05-16 ENCOUNTER — APPOINTMENT (OUTPATIENT)
Dept: INTERNAL MEDICINE | Age: 39
End: 2019-05-16

## 2019-05-16 ENCOUNTER — TELEPHONE (OUTPATIENT)
Dept: SCHEDULING | Age: 39
End: 2019-05-16

## 2019-05-21 ENCOUNTER — TELEPHONE (OUTPATIENT)
Dept: SCHEDULING | Age: 39
End: 2019-05-21

## 2019-05-21 ENCOUNTER — OFFICE VISIT (OUTPATIENT)
Dept: INTERNAL MEDICINE | Age: 39
End: 2019-05-21

## 2019-05-21 DIAGNOSIS — E66.01 MORBID OBESITY (CMD): Primary | ICD-10-CM

## 2019-05-21 DIAGNOSIS — G44.40 DRUG-INDUCED HEADACHE, NOT ELSEWHERE CLASSIFIED, NOT INTRACTABLE: ICD-10-CM

## 2019-05-21 PROCEDURE — 99213 OFFICE O/P EST LOW 20 MIN: CPT | Performed by: INTERNAL MEDICINE

## 2019-05-21 ASSESSMENT — PATIENT HEALTH QUESTIONNAIRE - PHQ9
SUM OF ALL RESPONSES TO PHQ9 QUESTIONS 1 AND 2: 0
2. FEELING DOWN, DEPRESSED OR HOPELESS: NOT AT ALL
1. LITTLE INTEREST OR PLEASURE IN DOING THINGS: NOT AT ALL
SUM OF ALL RESPONSES TO PHQ9 QUESTIONS 1 AND 2: 0

## 2019-05-21 ASSESSMENT — PAIN SCALES - GENERAL: PAINLEVEL: 0

## 2019-07-10 ENCOUNTER — TELEPHONE (OUTPATIENT)
Dept: SCHEDULING | Age: 39
End: 2019-07-10

## 2019-07-11 ENCOUNTER — WALK IN (OUTPATIENT)
Dept: URGENT CARE | Age: 39
End: 2019-07-11

## 2019-07-11 ENCOUNTER — HOSPITAL (OUTPATIENT)
Dept: OTHER | Age: 39
End: 2019-07-11

## 2019-07-11 ENCOUNTER — TELEPHONE (OUTPATIENT)
Dept: SCHEDULING | Age: 39
End: 2019-07-11

## 2019-07-11 ENCOUNTER — DIAGNOSTIC TRANS (OUTPATIENT)
Dept: OTHER | Age: 39
End: 2019-07-11

## 2019-07-11 VITALS
WEIGHT: 315 LBS | HEART RATE: 76 BPM | OXYGEN SATURATION: 98 % | TEMPERATURE: 98.3 F | BODY MASS INDEX: 48.34 KG/M2 | DIASTOLIC BLOOD PRESSURE: 74 MMHG | RESPIRATION RATE: 20 BRPM | SYSTOLIC BLOOD PRESSURE: 118 MMHG

## 2019-07-11 LAB
ALBUMIN SERPL-MCNC: 3.6 G/DL (ref 3.6–5.1)
ALBUMIN/GLOB SERPL: 0.8 {RATIO} (ref 1–2.4)
ALP SERPL-CCNC: 70 UNITS/L (ref 45–117)
ALT SERPL-CCNC: 79 UNITS/L
ANALYZER ANC (IANC): NORMAL
ANION GAP SERPL CALC-SCNC: 13 MMOL/L (ref 10–20)
AST SERPL-CCNC: 49 UNITS/L
BASOPHILS # BLD: 0 K/MCL (ref 0–0.3)
BASOPHILS NFR BLD: 1 %
BILIRUB SERPL-MCNC: 0.5 MG/DL (ref 0.2–1)
BUN SERPL-MCNC: 12 MG/DL (ref 6–20)
BUN/CREAT SERPL: 17 (ref 7–25)
CALCIUM SERPL-MCNC: 9.1 MG/DL (ref 8.4–10.2)
CHLORIDE SERPL-SCNC: 104 MMOL/L (ref 98–107)
CO2 SERPL-SCNC: 25 MMOL/L (ref 21–32)
CREAT SERPL-MCNC: 0.7 MG/DL (ref 0.67–1.17)
DIFFERENTIAL METHOD BLD: NORMAL
EOSINOPHIL # BLD: 0.1 K/MCL (ref 0.1–0.5)
EOSINOPHIL NFR BLD: 1 %
ERYTHROCYTE [DISTWIDTH] IN BLOOD: 12.9 % (ref 11–15)
GLOBULIN SER-MCNC: 4.4 G/DL (ref 2–4)
GLUCOSE SERPL-MCNC: 126 MG/DL (ref 65–99)
HCT VFR BLD CALC: 44 % (ref 39–51)
HGB BLD-MCNC: 15.7 G/DL (ref 13–17)
IMM GRANULOCYTES # BLD AUTO: 0 K/MCL (ref 0–0.2)
IMM GRANULOCYTES NFR BLD: 0 %
LYMPHOCYTES # BLD: 2 K/MCL (ref 1–4.8)
LYMPHOCYTES NFR BLD: 28 %
MCH RBC QN AUTO: 29.6 PG (ref 26–34)
MCHC RBC AUTO-ENTMCNC: 35.7 G/DL (ref 32–36.5)
MCV RBC AUTO: 82.9 FL (ref 78–100)
MONOCYTES # BLD: 0.4 K/MCL (ref 0.3–0.9)
MONOCYTES NFR BLD: 6 %
NEUTROPHILS # BLD: 4.5 K/MCL (ref 1.8–7.7)
NEUTROPHILS NFR BLD: 64 %
NEUTS SEG NFR BLD: NORMAL %
NRBC (NRBCRE): 0 /100 WBC
PLATELET # BLD: 276 K/MCL (ref 140–450)
POTASSIUM SERPL-SCNC: 3.3 MMOL/L (ref 3.4–5.1)
PROT SERPL-MCNC: 8 G/DL (ref 6.4–8.2)
RBC # BLD: 5.31 MIL/MCL (ref 4.5–5.9)
SODIUM SERPL-SCNC: 139 MMOL/L (ref 135–145)
TROPONIN I SERPL HS-MCNC: <0.02 NG/ML
WBC # BLD: 7.1 K/MCL (ref 4.2–11)

## 2019-07-11 PROCEDURE — 99285 EMERGENCY DEPT VISIT HI MDM: CPT | Performed by: EMERGENCY MEDICINE

## 2019-07-13 ENCOUNTER — TELEPHONE (OUTPATIENT)
Dept: SCHEDULING | Age: 39
End: 2019-07-13

## 2019-07-15 ENCOUNTER — TELEPHONE (OUTPATIENT)
Dept: INTERNAL MEDICINE | Age: 39
End: 2019-07-15

## 2019-07-26 ENCOUNTER — TELEPHONE (OUTPATIENT)
Dept: SCHEDULING | Age: 39
End: 2019-07-26

## 2019-07-27 ENCOUNTER — APPOINTMENT (OUTPATIENT)
Dept: INTERNAL MEDICINE | Age: 39
End: 2019-07-27

## 2019-07-31 ENCOUNTER — APPOINTMENT (OUTPATIENT)
Dept: INTERNAL MEDICINE | Age: 39
End: 2019-07-31

## 2019-07-31 ENCOUNTER — TELEPHONE (OUTPATIENT)
Dept: SCHEDULING | Age: 39
End: 2019-07-31

## 2019-07-31 ENCOUNTER — OFFICE VISIT (OUTPATIENT)
Dept: INTERNAL MEDICINE | Age: 39
End: 2019-07-31

## 2019-07-31 DIAGNOSIS — F43.22 ADJUSTMENT DISORDER WITH ANXIOUS MOOD: Primary | ICD-10-CM

## 2019-07-31 PROCEDURE — 99213 OFFICE O/P EST LOW 20 MIN: CPT | Performed by: INTERNAL MEDICINE

## 2019-07-31 ASSESSMENT — PAIN SCALES - GENERAL: PAINLEVEL: 0

## 2019-10-15 ENCOUNTER — TELEPHONE (OUTPATIENT)
Dept: SCHEDULING | Age: 39
End: 2019-10-15

## 2019-10-15 ENCOUNTER — OFFICE VISIT (OUTPATIENT)
Dept: INTERNAL MEDICINE | Age: 39
End: 2019-10-15

## 2019-10-15 DIAGNOSIS — R07.2 PRECORDIAL PAIN: ICD-10-CM

## 2019-10-15 DIAGNOSIS — Z23 NEED FOR IMMUNIZATION AGAINST INFLUENZA: Primary | ICD-10-CM

## 2019-10-15 PROCEDURE — 99214 OFFICE O/P EST MOD 30 MIN: CPT | Performed by: INTERNAL MEDICINE

## 2019-10-15 PROCEDURE — 90686 IIV4 VACC NO PRSV 0.5 ML IM: CPT

## 2019-10-15 PROCEDURE — 90471 IMMUNIZATION ADMIN: CPT

## 2019-10-15 PROCEDURE — 93000 ELECTROCARDIOGRAM COMPLETE: CPT | Performed by: INTERNAL MEDICINE

## 2019-10-15 ASSESSMENT — PAIN SCALES - GENERAL: PAINLEVEL: 3-4

## 2019-12-19 ENCOUNTER — TELEPHONE (OUTPATIENT)
Dept: CARDIOLOGY | Age: 39
End: 2019-12-19

## 2019-12-23 ENCOUNTER — TELEPHONE (OUTPATIENT)
Dept: SCHEDULING | Age: 39
End: 2019-12-23

## 2019-12-23 ENCOUNTER — ANCILLARY PROCEDURE (OUTPATIENT)
Dept: CARDIOLOGY | Age: 39
End: 2019-12-23
Attending: INTERNAL MEDICINE

## 2019-12-23 VITALS — WEIGHT: 315 LBS | BODY MASS INDEX: 47.74 KG/M2 | HEIGHT: 68 IN

## 2019-12-23 DIAGNOSIS — R07.2 PRECORDIAL PAIN: ICD-10-CM

## 2019-12-23 LAB
PEAK HR ACHIEVED: 164 BPM
RESTING HR ACHIEVED: 81 BPM
STRESS BASELINE BP: NORMAL MMHG
STRESS POST PEAK BP: NORMAL MMHG

## 2019-12-23 PROCEDURE — A9502 TC99M TETROFOSMIN: HCPCS | Performed by: INTERNAL MEDICINE

## 2019-12-23 PROCEDURE — 93015 CV STRESS TEST SUPVJ I&R: CPT | Performed by: INTERNAL MEDICINE

## 2019-12-23 PROCEDURE — 78452 HT MUSCLE IMAGE SPECT MULT: CPT | Performed by: INTERNAL MEDICINE

## 2019-12-23 ASSESSMENT — EXERCISE STRESS TEST
PEAK_BP: 136/60
PEAK_RPP: 24600
PEAK_RPP: 24600
PEAK_BP: 150/60
PEAK_RPP: 10530
PEAK_HR: 142
PEAK_HR: 81
PEAK_BP: 150/54
PEAK_HR: 164
PEAK_RPP: 21300
PEAK_HR: 100
STAGE_CATEGORIES: RECOVERY 2
STAGE_CATEGORIES: RESTING
STAGE_CATEGORIES: 2
PEAK_HR: 118
STAGE_CATEGORIES: 1
STAGE_CATEGORIES: RECOVERY 0
PEAK_BP: 150/54
PEAK_RPP: 16048
STAGE_CATEGORIES: RECOVERY 1
PEAK_BP: 118/60
PEAK_RPP: 11800
PEAK_HR: 164
STOPPAGE_REASON: GENERAL FATIGUE
PEAK_BP: 130/80

## 2020-01-07 ENCOUNTER — TELEPHONE (OUTPATIENT)
Dept: SCHEDULING | Age: 40
End: 2020-01-07

## 2020-03-17 ENCOUNTER — OFFICE VISIT (OUTPATIENT)
Dept: INTERNAL MEDICINE | Age: 40
End: 2020-03-17

## 2020-03-17 ENCOUNTER — TELEPHONE (OUTPATIENT)
Dept: SCHEDULING | Age: 40
End: 2020-03-17

## 2020-03-17 DIAGNOSIS — Z00.00 ANNUAL PHYSICAL EXAM: Primary | ICD-10-CM

## 2020-03-17 DIAGNOSIS — R53.83 FATIGUE, UNSPECIFIED TYPE: ICD-10-CM

## 2020-03-17 DIAGNOSIS — E66.01 MORBID OBESITY (CMD): ICD-10-CM

## 2020-03-17 DIAGNOSIS — L30.9 DERMATITIS: ICD-10-CM

## 2020-03-17 DIAGNOSIS — R74.8 ELEVATED LIVER ENZYMES: ICD-10-CM

## 2020-03-17 DIAGNOSIS — E78.5 DYSLIPIDEMIA: ICD-10-CM

## 2020-03-17 PROBLEM — F41.9 ANXIETY DISORDER: Status: ACTIVE | Noted: 2020-03-17

## 2020-03-17 PROCEDURE — 99395 PREV VISIT EST AGE 18-39: CPT | Performed by: INTERNAL MEDICINE

## 2020-03-17 RX ORDER — UREA 200 MG/G
GEL TOPICAL PRN
Qty: 85 G | Refills: 3 | Status: SHIPPED | OUTPATIENT
Start: 2020-03-17 | End: 2022-09-13

## 2020-03-17 ASSESSMENT — PATIENT HEALTH QUESTIONNAIRE - PHQ9
2. FEELING DOWN, DEPRESSED OR HOPELESS: NOT AT ALL
1. LITTLE INTEREST OR PLEASURE IN DOING THINGS: NOT AT ALL
SUM OF ALL RESPONSES TO PHQ9 QUESTIONS 1 AND 2: 0
SUM OF ALL RESPONSES TO PHQ9 QUESTIONS 1 AND 2: 0

## 2020-03-17 ASSESSMENT — PAIN SCALES - GENERAL: PAINLEVEL: 0

## 2020-03-17 ASSESSMENT — ENCOUNTER SYMPTOMS: FATIGUE: 1

## 2020-05-10 ENCOUNTER — TELEPHONE (OUTPATIENT)
Dept: SCHEDULING | Age: 40
End: 2020-05-10

## 2020-05-10 ENCOUNTER — WALK IN (OUTPATIENT)
Dept: URGENT CARE | Age: 40
End: 2020-05-10

## 2020-05-10 VITALS
RESPIRATION RATE: 18 BRPM | HEART RATE: 72 BPM | OXYGEN SATURATION: 95 % | DIASTOLIC BLOOD PRESSURE: 79 MMHG | TEMPERATURE: 99.3 F | SYSTOLIC BLOOD PRESSURE: 126 MMHG

## 2020-05-10 DIAGNOSIS — R53.83 FATIGUE, UNSPECIFIED TYPE: ICD-10-CM

## 2020-05-10 DIAGNOSIS — K21.9 GASTROESOPHAGEAL REFLUX DISEASE WITHOUT ESOPHAGITIS: ICD-10-CM

## 2020-05-10 DIAGNOSIS — R11.0 NAUSEA: Primary | ICD-10-CM

## 2020-05-10 DIAGNOSIS — Z71.89 EDUCATED ABOUT COVID-19 VIRUS INFECTION: ICD-10-CM

## 2020-05-10 PROCEDURE — 99203 OFFICE O/P NEW LOW 30 MIN: CPT | Performed by: PHYSICIAN ASSISTANT

## 2020-05-10 RX ORDER — OMEPRAZOLE 20 MG/1
20 CAPSULE, DELAYED RELEASE ORAL DAILY
Qty: 30 CAPSULE | Refills: 0 | Status: SHIPPED | OUTPATIENT
Start: 2020-05-10 | End: 2022-09-13

## 2020-05-10 ASSESSMENT — ENCOUNTER SYMPTOMS
BACK PAIN: 0
FEVER: 0
CHILLS: 0
FATIGUE: 1
VOMITING: 0
RHINORRHEA: 0
HEADACHES: 0
DIARRHEA: 0
NAUSEA: 1
ABDOMINAL PAIN: 1
SORE THROAT: 0
SHORTNESS OF BREATH: 0
COUGH: 0

## 2020-05-11 ENCOUNTER — E-ADVICE (OUTPATIENT)
Dept: INTERNAL MEDICINE | Age: 40
End: 2020-05-11

## 2020-05-11 ENCOUNTER — TELEPHONE (OUTPATIENT)
Dept: SCHEDULING | Age: 40
End: 2020-05-11

## 2020-05-12 ENCOUNTER — V-VISIT (OUTPATIENT)
Dept: INTERNAL MEDICINE | Age: 40
End: 2020-05-12

## 2020-05-12 ENCOUNTER — TELEPHONE (OUTPATIENT)
Dept: INTERNAL MEDICINE | Age: 40
End: 2020-05-12

## 2020-05-12 ENCOUNTER — TELEPHONE (OUTPATIENT)
Dept: SCHEDULING | Age: 40
End: 2020-05-12

## 2020-05-12 DIAGNOSIS — K21.9 GASTROESOPHAGEAL REFLUX DISEASE WITHOUT ESOPHAGITIS: Primary | ICD-10-CM

## 2020-05-12 PROCEDURE — 99213 OFFICE O/P EST LOW 20 MIN: CPT | Performed by: INTERNAL MEDICINE

## 2020-05-13 ASSESSMENT — ENCOUNTER SYMPTOMS
CONSTITUTIONAL NEGATIVE: 1
PSYCHIATRIC NEGATIVE: 1
RESPIRATORY NEGATIVE: 1
ROS GI COMMENTS: ACID REFLUX

## 2020-11-13 ENCOUNTER — APPOINTMENT (OUTPATIENT)
Dept: INTERNAL MEDICINE | Age: 40
End: 2020-11-13

## 2020-11-13 ENCOUNTER — TELEPHONE (OUTPATIENT)
Dept: SCHEDULING | Age: 40
End: 2020-11-13

## 2020-11-14 ENCOUNTER — IMMUNIZATION (OUTPATIENT)
Dept: INTERNAL MEDICINE | Age: 40
End: 2020-11-14

## 2020-11-14 DIAGNOSIS — Z23 INFLUENZA VACCINE NEEDED: Primary | ICD-10-CM

## 2020-11-14 PROCEDURE — 90471 IMMUNIZATION ADMIN: CPT

## 2020-11-14 PROCEDURE — 90686 IIV4 VACC NO PRSV 0.5 ML IM: CPT

## 2021-05-26 VITALS
HEIGHT: 68 IN | HEART RATE: 79 BPM | BODY MASS INDEX: 49.87 KG/M2 | SYSTOLIC BLOOD PRESSURE: 108 MMHG | WEIGHT: 315 LBS | TEMPERATURE: 97 F | OXYGEN SATURATION: 96 % | OXYGEN SATURATION: 96 % | BODY MASS INDEX: 48.35 KG/M2 | DIASTOLIC BLOOD PRESSURE: 75 MMHG | TEMPERATURE: 98.9 F | DIASTOLIC BLOOD PRESSURE: 76 MMHG | TEMPERATURE: 97.5 F | HEART RATE: 65 BPM | HEART RATE: 72 BPM | SYSTOLIC BLOOD PRESSURE: 109 MMHG | BODY MASS INDEX: 49.26 KG/M2 | DIASTOLIC BLOOD PRESSURE: 66 MMHG | WEIGHT: 315 LBS | WEIGHT: 315 LBS | DIASTOLIC BLOOD PRESSURE: 72 MMHG | TEMPERATURE: 97 F | SYSTOLIC BLOOD PRESSURE: 108 MMHG | WEIGHT: 311 LBS | OXYGEN SATURATION: 98 % | OXYGEN SATURATION: 96 % | HEART RATE: 67 BPM | SYSTOLIC BLOOD PRESSURE: 118 MMHG | BODY MASS INDEX: 47.13 KG/M2

## 2021-09-07 ENCOUNTER — TELEPHONE (OUTPATIENT)
Dept: SCHEDULING | Age: 41
End: 2021-09-07

## 2021-09-13 ENCOUNTER — TELEPHONE (OUTPATIENT)
Dept: SCHEDULING | Age: 41
End: 2021-09-13

## 2021-09-13 ENCOUNTER — TELEPHONE (OUTPATIENT)
Dept: INTERNAL MEDICINE | Age: 41
End: 2021-09-13

## 2021-09-13 ENCOUNTER — TELEPHONE (OUTPATIENT)
Dept: URGENT CARE | Age: 41
End: 2021-09-13

## 2021-09-21 ENCOUNTER — TELEPHONE (OUTPATIENT)
Dept: SCHEDULING | Age: 41
End: 2021-09-21

## 2021-09-23 ENCOUNTER — OFFICE VISIT (OUTPATIENT)
Dept: INTERNAL MEDICINE | Age: 41
End: 2021-09-23

## 2021-09-23 ENCOUNTER — LAB SERVICES (OUTPATIENT)
Dept: LAB | Age: 41
End: 2021-09-23

## 2021-09-23 VITALS
SYSTOLIC BLOOD PRESSURE: 116 MMHG | WEIGHT: 315 LBS | BODY MASS INDEX: 46.65 KG/M2 | DIASTOLIC BLOOD PRESSURE: 78 MMHG | OXYGEN SATURATION: 97 % | HEIGHT: 69 IN | TEMPERATURE: 96.8 F | HEART RATE: 86 BPM | RESPIRATION RATE: 18 BRPM

## 2021-09-23 DIAGNOSIS — Z00.00 ANNUAL PHYSICAL EXAM: ICD-10-CM

## 2021-09-23 DIAGNOSIS — E66.01 MORBID OBESITY (CMD): ICD-10-CM

## 2021-09-23 DIAGNOSIS — E78.5 DYSLIPIDEMIA: ICD-10-CM

## 2021-09-23 DIAGNOSIS — K76.0 FATTY LIVER DISEASE, NONALCOHOLIC: ICD-10-CM

## 2021-09-23 DIAGNOSIS — Z00.00 ANNUAL PHYSICAL EXAM: Primary | ICD-10-CM

## 2021-09-23 LAB — HBA1C MFR BLD: 6.3 % (ref 4.5–5.6)

## 2021-09-23 PROCEDURE — 90686 IIV4 VACC NO PRSV 0.5 ML IM: CPT

## 2021-09-23 PROCEDURE — 80053 COMPREHEN METABOLIC PANEL: CPT | Performed by: INTERNAL MEDICINE

## 2021-09-23 PROCEDURE — 90471 IMMUNIZATION ADMIN: CPT

## 2021-09-23 PROCEDURE — 99396 PREV VISIT EST AGE 40-64: CPT | Performed by: INTERNAL MEDICINE

## 2021-09-23 PROCEDURE — 99213 OFFICE O/P EST LOW 20 MIN: CPT | Performed by: INTERNAL MEDICINE

## 2021-09-23 PROCEDURE — 90715 TDAP VACCINE 7 YRS/> IM: CPT

## 2021-09-23 PROCEDURE — 83036 HEMOGLOBIN GLYCOSYLATED A1C: CPT | Performed by: INTERNAL MEDICINE

## 2021-09-23 PROCEDURE — 80061 LIPID PANEL: CPT | Performed by: INTERNAL MEDICINE

## 2021-09-23 PROCEDURE — 90472 IMMUNIZATION ADMIN EACH ADD: CPT

## 2021-09-23 PROCEDURE — 36415 COLL VENOUS BLD VENIPUNCTURE: CPT | Performed by: INTERNAL MEDICINE

## 2021-09-23 ASSESSMENT — PAIN SCALES - GENERAL: PAINLEVEL: 0

## 2021-09-23 ASSESSMENT — PATIENT HEALTH QUESTIONNAIRE - PHQ9
CLINICAL INTERPRETATION OF PHQ2 SCORE: NO FURTHER SCREENING NEEDED
SUM OF ALL RESPONSES TO PHQ9 QUESTIONS 1 AND 2: 0
CLINICAL INTERPRETATION OF PHQ9 SCORE: NO FURTHER SCREENING NEEDED
SUM OF ALL RESPONSES TO PHQ9 QUESTIONS 1 AND 2: 0
2. FEELING DOWN, DEPRESSED OR HOPELESS: NOT AT ALL
1. LITTLE INTEREST OR PLEASURE IN DOING THINGS: NOT AT ALL

## 2021-09-24 LAB
ALBUMIN SERPL-MCNC: 3.7 G/DL (ref 3.6–5.1)
ALBUMIN/GLOB SERPL: 0.9 {RATIO} (ref 1–2.4)
ALP SERPL-CCNC: 106 UNITS/L (ref 45–117)
ALT SERPL-CCNC: 147 UNITS/L
ANION GAP SERPL CALC-SCNC: 15 MMOL/L (ref 10–20)
AST SERPL-CCNC: 91 UNITS/L
BILIRUB SERPL-MCNC: 0.5 MG/DL (ref 0.2–1)
BUN SERPL-MCNC: 7 MG/DL (ref 6–20)
BUN/CREAT SERPL: 9 (ref 7–25)
CALCIUM SERPL-MCNC: 9.2 MG/DL (ref 8.4–10.2)
CHLORIDE SERPL-SCNC: 105 MMOL/L (ref 98–107)
CHOLEST SERPL-MCNC: 166 MG/DL
CHOLEST/HDLC SERPL: 4.9 {RATIO}
CO2 SERPL-SCNC: 26 MMOL/L (ref 21–32)
CREAT SERPL-MCNC: 0.76 MG/DL (ref 0.67–1.17)
FASTING DURATION TIME PATIENT: 6 HOURS (ref 0–999)
FASTING DURATION TIME PATIENT: 6 HOURS (ref 0–999)
GFR SERPLBLD BASED ON 1.73 SQ M-ARVRAT: >90 ML/MIN
GLOBULIN SER-MCNC: 4 G/DL (ref 2–4)
GLUCOSE SERPL-MCNC: 65 MG/DL (ref 70–99)
HDLC SERPL-MCNC: 34 MG/DL
LDLC SERPL CALC-MCNC: 102 MG/DL
NONHDLC SERPL-MCNC: 132 MG/DL
POTASSIUM SERPL-SCNC: 4.1 MMOL/L (ref 3.4–5.1)
PROT SERPL-MCNC: 7.7 G/DL (ref 6.4–8.2)
SODIUM SERPL-SCNC: 142 MMOL/L (ref 135–145)
TRIGL SERPL-MCNC: 148 MG/DL

## 2021-10-21 ENCOUNTER — TELEPHONE (OUTPATIENT)
Dept: SCHEDULING | Age: 41
End: 2021-10-21

## 2021-11-12 ENCOUNTER — HOSPITAL ENCOUNTER (OUTPATIENT)
Dept: CT IMAGING | Age: 41
Discharge: HOME | End: 2021-11-12
Attending: INTERNAL MEDICINE

## 2021-11-12 DIAGNOSIS — E78.5 DYSLIPIDEMIA: ICD-10-CM

## 2021-11-12 DIAGNOSIS — E66.01 MORBID OBESITY (CMD): ICD-10-CM

## 2021-11-12 PROCEDURE — 75571 CT HRT W/O DYE W/CA TEST: CPT

## 2021-11-26 DIAGNOSIS — I34.81 CALCIFICATION OF MITRAL VALVE: Primary | ICD-10-CM

## 2021-11-27 ENCOUNTER — TELEPHONE (OUTPATIENT)
Dept: SCHEDULING | Age: 41
End: 2021-11-27

## 2021-12-03 ENCOUNTER — OFFICE VISIT (OUTPATIENT)
Dept: INTERNAL MEDICINE | Age: 41
End: 2021-12-03

## 2021-12-03 VITALS
OXYGEN SATURATION: 97 % | DIASTOLIC BLOOD PRESSURE: 63 MMHG | SYSTOLIC BLOOD PRESSURE: 126 MMHG | HEART RATE: 70 BPM | TEMPERATURE: 97 F | RESPIRATION RATE: 16 BRPM

## 2021-12-03 DIAGNOSIS — E66.01 MORBID OBESITY (CMD): ICD-10-CM

## 2021-12-03 DIAGNOSIS — R73.03 PREDIABETES: Primary | ICD-10-CM

## 2021-12-03 DIAGNOSIS — I34.81 MITRAL ANNULAR CALCIFICATION: ICD-10-CM

## 2021-12-03 PROCEDURE — 99214 OFFICE O/P EST MOD 30 MIN: CPT | Performed by: INTERNAL MEDICINE

## 2021-12-03 RX ORDER — METFORMIN HYDROCHLORIDE 500 MG/1
500 TABLET, EXTENDED RELEASE ORAL
Qty: 90 TABLET | Refills: 1 | Status: SHIPPED | OUTPATIENT
Start: 2021-12-03 | End: 2023-03-13 | Stop reason: CLARIF

## 2021-12-03 ASSESSMENT — PATIENT HEALTH QUESTIONNAIRE - PHQ9
2. FEELING DOWN, DEPRESSED OR HOPELESS: NOT AT ALL
1. LITTLE INTEREST OR PLEASURE IN DOING THINGS: NOT AT ALL
SUM OF ALL RESPONSES TO PHQ9 QUESTIONS 1 AND 2: 0
CLINICAL INTERPRETATION OF PHQ2 SCORE: NO FURTHER SCREENING NEEDED
SUM OF ALL RESPONSES TO PHQ9 QUESTIONS 1 AND 2: 0

## 2021-12-03 ASSESSMENT — PAIN SCALES - GENERAL: PAINLEVEL: 0

## 2021-12-30 ENCOUNTER — TELEPHONE (OUTPATIENT)
Dept: CARDIOLOGY | Age: 41
End: 2021-12-30

## 2022-01-03 ENCOUNTER — APPOINTMENT (OUTPATIENT)
Dept: CARDIOLOGY | Age: 42
End: 2022-01-03
Attending: INTERNAL MEDICINE

## 2022-01-18 ENCOUNTER — HOSPITAL ENCOUNTER (OUTPATIENT)
Dept: CARDIOLOGY | Age: 42
Discharge: HOME | End: 2022-01-18
Attending: INTERNAL MEDICINE

## 2022-01-18 ENCOUNTER — TELEPHONE (OUTPATIENT)
Dept: SCHEDULING | Age: 42
End: 2022-01-18

## 2022-01-18 DIAGNOSIS — I34.81 CALCIFICATION OF MITRAL VALVE: ICD-10-CM

## 2022-01-18 PROCEDURE — 93306 TTE W/DOPPLER COMPLETE: CPT

## 2022-01-18 PROCEDURE — 10002805 HB CONTRAST AGENT: Performed by: INTERNAL MEDICINE

## 2022-01-18 PROCEDURE — 93306 TTE W/DOPPLER COMPLETE: CPT | Performed by: INTERNAL MEDICINE

## 2022-01-18 RX ADMIN — PERFLUTREN 1.3 ML: 6.52 INJECTION, SUSPENSION INTRAVENOUS at 08:36

## 2022-01-20 ENCOUNTER — APPOINTMENT (OUTPATIENT)
Dept: INTERNAL MEDICINE | Age: 42
End: 2022-01-20

## 2022-01-24 ENCOUNTER — TELEPHONE (OUTPATIENT)
Dept: SCHEDULING | Age: 42
End: 2022-01-24

## 2022-01-24 DIAGNOSIS — R93.1 ABNORMAL ECHOCARDIOGRAM: Primary | ICD-10-CM

## 2022-01-24 DIAGNOSIS — I27.20 PULMONARY HYPERTENSION (CMD): ICD-10-CM

## 2022-01-26 ENCOUNTER — OFFICE VISIT (OUTPATIENT)
Dept: INTERNAL MEDICINE | Age: 42
End: 2022-01-26

## 2022-01-26 VITALS
DIASTOLIC BLOOD PRESSURE: 68 MMHG | WEIGHT: 315 LBS | HEART RATE: 81 BPM | OXYGEN SATURATION: 97 % | SYSTOLIC BLOOD PRESSURE: 102 MMHG | BODY MASS INDEX: 52.13 KG/M2 | RESPIRATION RATE: 15 BRPM | TEMPERATURE: 96.3 F

## 2022-01-26 DIAGNOSIS — E78.5 DYSLIPIDEMIA: Primary | ICD-10-CM

## 2022-01-26 DIAGNOSIS — R06.81 APNEA: ICD-10-CM

## 2022-01-26 DIAGNOSIS — E66.01 MORBID OBESITY (CMD): ICD-10-CM

## 2022-01-26 DIAGNOSIS — I27.20 PULMONARY HYPERTENSION (CMD): ICD-10-CM

## 2022-01-26 PROCEDURE — 99213 OFFICE O/P EST LOW 20 MIN: CPT | Performed by: INTERNAL MEDICINE

## 2022-01-26 RX ORDER — ATORVASTATIN CALCIUM 10 MG/1
10 TABLET, FILM COATED ORAL DAILY
Qty: 30 TABLET | Refills: 6 | Status: SHIPPED | OUTPATIENT
Start: 2022-01-26 | End: 2023-03-13 | Stop reason: CLARIF

## 2022-01-26 ASSESSMENT — PATIENT HEALTH QUESTIONNAIRE - PHQ9
1. LITTLE INTEREST OR PLEASURE IN DOING THINGS: NOT AT ALL
2. FEELING DOWN, DEPRESSED OR HOPELESS: NOT AT ALL
SUM OF ALL RESPONSES TO PHQ9 QUESTIONS 1 AND 2: 0
SUM OF ALL RESPONSES TO PHQ9 QUESTIONS 1 AND 2: 0
CLINICAL INTERPRETATION OF PHQ2 SCORE: NO FURTHER SCREENING NEEDED

## 2022-01-26 ASSESSMENT — ENCOUNTER SYMPTOMS: APNEA: 1

## 2022-01-26 ASSESSMENT — PAIN SCALES - GENERAL: PAINLEVEL: 0

## 2022-02-15 ENCOUNTER — TELEPHONE (OUTPATIENT)
Dept: CARDIOLOGY | Age: 42
End: 2022-02-15

## 2022-02-16 ENCOUNTER — APPOINTMENT (OUTPATIENT)
Dept: CARDIOLOGY | Age: 42
End: 2022-02-16
Attending: INTERNAL MEDICINE

## 2022-02-20 ENCOUNTER — EXTERNAL RECORD (OUTPATIENT)
Dept: HEALTH INFORMATION MANAGEMENT | Facility: OTHER | Age: 42
End: 2022-02-20

## 2022-03-11 ENCOUNTER — TELEPHONE (OUTPATIENT)
Dept: INTERNAL MEDICINE | Age: 42
End: 2022-03-11

## 2022-03-11 DIAGNOSIS — G47.33 OBSTRUCTIVE SLEEP APNEA OF ADULT: Primary | ICD-10-CM

## 2022-03-15 ENCOUNTER — OFFICE VISIT (OUTPATIENT)
Dept: CARDIOLOGY | Age: 42
End: 2022-03-15
Attending: INTERNAL MEDICINE

## 2022-03-15 VITALS
TEMPERATURE: 98.2 F | WEIGHT: 315 LBS | SYSTOLIC BLOOD PRESSURE: 117 MMHG | HEIGHT: 69 IN | RESPIRATION RATE: 17 BRPM | DIASTOLIC BLOOD PRESSURE: 69 MMHG | HEART RATE: 75 BPM | BODY MASS INDEX: 46.65 KG/M2 | OXYGEN SATURATION: 97 %

## 2022-03-15 DIAGNOSIS — E78.5 DYSLIPIDEMIA: Primary | ICD-10-CM

## 2022-03-15 DIAGNOSIS — R06.00 DYSPNEA, UNSPECIFIED TYPE: ICD-10-CM

## 2022-03-15 PROCEDURE — 93000 ELECTROCARDIOGRAM COMPLETE: CPT | Performed by: INTERNAL MEDICINE

## 2022-03-15 PROCEDURE — 99214 OFFICE O/P EST MOD 30 MIN: CPT | Performed by: INTERNAL MEDICINE

## 2022-03-15 ASSESSMENT — PATIENT HEALTH QUESTIONNAIRE - PHQ9
2. FEELING DOWN, DEPRESSED OR HOPELESS: NOT AT ALL
SUM OF ALL RESPONSES TO PHQ9 QUESTIONS 1 AND 2: 0
CLINICAL INTERPRETATION OF PHQ2 SCORE: NO FURTHER SCREENING NEEDED
SUM OF ALL RESPONSES TO PHQ9 QUESTIONS 1 AND 2: 0
1. LITTLE INTEREST OR PLEASURE IN DOING THINGS: NOT AT ALL

## 2022-03-15 ASSESSMENT — PAIN SCALES - GENERAL: PAINLEVEL: 0

## 2022-04-10 PROBLEM — R06.00 DYSPNEA: Status: ACTIVE | Noted: 2022-04-10

## 2022-09-13 ENCOUNTER — APPOINTMENT (OUTPATIENT)
Dept: CT IMAGING | Age: 42
DRG: 872 | End: 2022-09-13

## 2022-09-13 ENCOUNTER — HOSPITAL ENCOUNTER (INPATIENT)
Age: 42
LOS: 2 days | Discharge: HOME | DRG: 872 | End: 2022-09-15
Attending: EMERGENCY MEDICINE | Admitting: INTERNAL MEDICINE

## 2022-09-13 ENCOUNTER — E-ADVICE (OUTPATIENT)
Dept: INTERNAL MEDICINE | Age: 42
End: 2022-09-13

## 2022-09-13 ENCOUNTER — TELEPHONE (OUTPATIENT)
Dept: SCHEDULING | Age: 42
End: 2022-09-13

## 2022-09-13 DIAGNOSIS — K76.0 FATTY LIVER DISEASE, NONALCOHOLIC: ICD-10-CM

## 2022-09-13 DIAGNOSIS — E86.0 DEHYDRATION: ICD-10-CM

## 2022-09-13 DIAGNOSIS — K57.90 DIVERTICULOSIS: ICD-10-CM

## 2022-09-13 DIAGNOSIS — K57.20 DIVERTICULITIS OF COLON WITH PERFORATION: ICD-10-CM

## 2022-09-13 DIAGNOSIS — D72.829 LEUKOCYTOSIS, UNSPECIFIED TYPE: ICD-10-CM

## 2022-09-13 DIAGNOSIS — I11.9 HYPERTENSIVE HEART DISEASE WITHOUT HEART FAILURE: ICD-10-CM

## 2022-09-13 DIAGNOSIS — E78.5 DYSLIPIDEMIA: ICD-10-CM

## 2022-09-13 DIAGNOSIS — R50.9 ACUTE FEBRILE ILLNESS: ICD-10-CM

## 2022-09-13 DIAGNOSIS — E66.01 MORBID OBESITY (CMD): ICD-10-CM

## 2022-09-13 DIAGNOSIS — K57.20 DIVERTICULITIS OF LARGE INTESTINE WITH PERFORATION WITHOUT BLEEDING: Primary | ICD-10-CM

## 2022-09-13 DIAGNOSIS — F41.9 ANXIETY: ICD-10-CM

## 2022-09-13 DIAGNOSIS — R10.32 ABDOMINAL PAIN, LLQ (LEFT LOWER QUADRANT): ICD-10-CM

## 2022-09-13 DIAGNOSIS — R73.03 PREDIABETES: ICD-10-CM

## 2022-09-13 DIAGNOSIS — R10.9 ABDOMINAL PAIN IN MALE: ICD-10-CM

## 2022-09-13 DIAGNOSIS — A41.9 SEPSIS WITHOUT ACUTE ORGAN DYSFUNCTION, DUE TO UNSPECIFIED ORGANISM (CMD): ICD-10-CM

## 2022-09-13 LAB
ALBUMIN SERPL-MCNC: 3.6 G/DL (ref 3.6–5.1)
ALBUMIN/GLOB SERPL: 0.8 {RATIO} (ref 1–2.4)
ALP SERPL-CCNC: 80 UNITS/L (ref 45–117)
ALT SERPL-CCNC: 98 UNITS/L
ANION GAP SERPL CALC-SCNC: 12 MMOL/L (ref 7–19)
APPEARANCE UR: CLEAR
APPEARANCE UR: CLEAR
APTT PPP: 29 SEC (ref 22–30)
AST SERPL-CCNC: 57 UNITS/L
BASOPHILS # BLD: 0.1 K/MCL (ref 0–0.3)
BASOPHILS NFR BLD: 0 %
BILIRUB SERPL-MCNC: 0.7 MG/DL (ref 0.2–1)
BILIRUB UR QL STRIP: NEGATIVE
BILIRUB UR QL STRIP: NEGATIVE
BUN SERPL-MCNC: 7 MG/DL (ref 6–20)
BUN/CREAT SERPL: 9 (ref 7–25)
CALCIUM SERPL-MCNC: 8.9 MG/DL (ref 8.4–10.2)
CHLORIDE SERPL-SCNC: 99 MMOL/L (ref 97–110)
CO2 SERPL-SCNC: 27 MMOL/L (ref 21–32)
COLOR UR: YELLOW
COLOR UR: YELLOW
CREAT SERPL-MCNC: 0.82 MG/DL (ref 0.67–1.17)
DEPRECATED RDW RBC: 39.4 FL (ref 39–50)
EOSINOPHIL # BLD: 0 K/MCL (ref 0–0.5)
EOSINOPHIL NFR BLD: 0 %
ERYTHROCYTE [DISTWIDTH] IN BLOOD: 13.2 % (ref 11–15)
FASTING DURATION TIME PATIENT: ABNORMAL H
FLUAV RNA RESP QL NAA+PROBE: NOT DETECTED
FLUBV RNA RESP QL NAA+PROBE: NOT DETECTED
GFR SERPLBLD BASED ON 1.73 SQ M-ARVRAT: >90 ML/MIN
GLOBULIN SER-MCNC: 4.3 G/DL (ref 2–4)
GLUCOSE SERPL-MCNC: 109 MG/DL (ref 70–99)
GLUCOSE UR STRIP-MCNC: NEGATIVE MG/DL
GLUCOSE UR STRIP-MCNC: NEGATIVE MG/DL
HCT VFR BLD CALC: 41.9 % (ref 39–51)
HGB BLD-MCNC: 15.2 G/DL (ref 13–17)
HGB UR QL STRIP: NEGATIVE
HGB UR QL STRIP: NEGATIVE
IMM GRANULOCYTES # BLD AUTO: 0.1 K/MCL (ref 0–0.2)
IMM GRANULOCYTES # BLD: 0 %
INR PPP: 1.1
KETONES UR STRIP-MCNC: NEGATIVE MG/DL
KETONES UR STRIP-MCNC: NEGATIVE MG/DL
LACTATE BLDV-SCNC: 1.2 MMOL/L (ref 0–2)
LEUKOCYTE ESTERASE UR QL STRIP: NEGATIVE
LEUKOCYTE ESTERASE UR QL STRIP: NEGATIVE
LIPASE SERPL-CCNC: 92 UNITS/L (ref 73–393)
LYMPHOCYTES # BLD: 1.6 K/MCL (ref 1–4.8)
LYMPHOCYTES NFR BLD: 11 %
MCH RBC QN AUTO: 29.9 PG (ref 26–34)
MCHC RBC AUTO-ENTMCNC: 36.3 G/DL (ref 32–36.5)
MCV RBC AUTO: 82.5 FL (ref 78–100)
MONOCYTES # BLD: 1.3 K/MCL (ref 0.3–0.9)
MONOCYTES NFR BLD: 8 %
NEUTROPHILS # BLD: 12.3 K/MCL (ref 1.8–7.7)
NEUTROPHILS NFR BLD: 81 %
NITRITE UR QL STRIP: NEGATIVE
NITRITE UR QL STRIP: NEGATIVE
NRBC BLD MANUAL-RTO: 0 /100 WBC
PH UR STRIP: 7.5 UNITS (ref 5–7)
PH UR STRIP: 8 [PH] (ref 5–7)
PLATELET # BLD AUTO: 231 K/MCL (ref 140–450)
POTASSIUM SERPL-SCNC: 3.4 MMOL/L (ref 3.4–5.1)
PROT SERPL-MCNC: 7.9 G/DL (ref 6.4–8.2)
PROT UR STRIP-MCNC: NEGATIVE MG/DL
PROT UR STRIP-MCNC: NEGATIVE MG/DL
PROTHROMBIN TIME: 12.2 SEC (ref 9.7–11.8)
RBC # BLD: 5.08 MIL/MCL (ref 4.5–5.9)
RSV AG NPH QL IA.RAPID: NOT DETECTED
SARS-COV-2 RNA RESP QL NAA+PROBE: NOT DETECTED
SERVICE CMNT-IMP: NORMAL
SERVICE CMNT-IMP: NORMAL
SODIUM SERPL-SCNC: 135 MMOL/L (ref 135–145)
SP GR UR STRIP: 1.01 (ref 1–1.03)
SP GR UR STRIP: 1.01 (ref 1–1.03)
UROBILINOGEN UR STRIP-MCNC: 0.2 MG/DL
UROBILINOGEN UR STRIP-MCNC: 0.2 MG/DL
WBC # BLD: 15.3 K/MCL (ref 4.2–11)

## 2022-09-13 PROCEDURE — 99285 EMERGENCY DEPT VISIT HI MDM: CPT

## 2022-09-13 PROCEDURE — 96360 HYDRATION IV INFUSION INIT: CPT

## 2022-09-13 PROCEDURE — 83605 ASSAY OF LACTIC ACID: CPT

## 2022-09-13 PROCEDURE — 99223 1ST HOSP IP/OBS HIGH 75: CPT | Performed by: STUDENT IN AN ORGANIZED HEALTH CARE EDUCATION/TRAINING PROGRAM

## 2022-09-13 PROCEDURE — 85610 PROTHROMBIN TIME: CPT

## 2022-09-13 PROCEDURE — 0241U COVID/FLU/RSV PANEL: CPT | Performed by: NURSE PRACTITIONER

## 2022-09-13 PROCEDURE — 87040 BLOOD CULTURE FOR BACTERIA: CPT

## 2022-09-13 PROCEDURE — 10002803 HB RX 637: Performed by: EMERGENCY MEDICINE

## 2022-09-13 PROCEDURE — 83690 ASSAY OF LIPASE: CPT | Performed by: EMERGENCY MEDICINE

## 2022-09-13 PROCEDURE — 10002805 HB CONTRAST AGENT: Performed by: NURSE PRACTITIONER

## 2022-09-13 PROCEDURE — 10002807 HB RX 258

## 2022-09-13 PROCEDURE — 10000002 HB ROOM CHARGE MED SURG

## 2022-09-13 PROCEDURE — 10002807 HB RX 258: Performed by: NURSE PRACTITIONER

## 2022-09-13 PROCEDURE — 83036 HEMOGLOBIN GLYCOSYLATED A1C: CPT | Performed by: STUDENT IN AN ORGANIZED HEALTH CARE EDUCATION/TRAINING PROGRAM

## 2022-09-13 PROCEDURE — 85025 COMPLETE CBC W/AUTO DIFF WBC: CPT | Performed by: EMERGENCY MEDICINE

## 2022-09-13 PROCEDURE — 81003 URINALYSIS AUTO W/O SCOPE: CPT

## 2022-09-13 PROCEDURE — C9803 HOPD COVID-19 SPEC COLLECT: HCPCS

## 2022-09-13 PROCEDURE — 10002801 HB RX 250 W/O HCPCS

## 2022-09-13 PROCEDURE — 10002807 HB RX 258: Performed by: STUDENT IN AN ORGANIZED HEALTH CARE EDUCATION/TRAINING PROGRAM

## 2022-09-13 PROCEDURE — 99285 EMERGENCY DEPT VISIT HI MDM: CPT | Performed by: EMERGENCY MEDICINE

## 2022-09-13 PROCEDURE — 80053 COMPREHEN METABOLIC PANEL: CPT | Performed by: EMERGENCY MEDICINE

## 2022-09-13 PROCEDURE — 81003 URINALYSIS AUTO W/O SCOPE: CPT | Performed by: EMERGENCY MEDICINE

## 2022-09-13 PROCEDURE — 85730 THROMBOPLASTIN TIME PARTIAL: CPT

## 2022-09-13 PROCEDURE — G1004 CDSM NDSC: HCPCS

## 2022-09-13 PROCEDURE — 10002800 HB RX 250 W HCPCS

## 2022-09-13 PROCEDURE — 74177 CT ABD & PELVIS W/CONTRAST: CPT

## 2022-09-13 RX ORDER — DEXTROSE MONOHYDRATE 25 G/50ML
25 INJECTION, SOLUTION INTRAVENOUS PRN
Status: DISCONTINUED | OUTPATIENT
Start: 2022-09-13 | End: 2022-09-15 | Stop reason: HOSPADM

## 2022-09-13 RX ORDER — IBUPROFEN 400 MG/1
400 TABLET ORAL ONCE
Status: COMPLETED | OUTPATIENT
Start: 2022-09-13 | End: 2022-09-13

## 2022-09-13 RX ORDER — AMOXICILLIN 250 MG
2 CAPSULE ORAL DAILY PRN
Status: DISCONTINUED | OUTPATIENT
Start: 2022-09-13 | End: 2022-09-15 | Stop reason: HOSPADM

## 2022-09-13 RX ORDER — ATORVASTATIN CALCIUM 10 MG/1
10 TABLET, FILM COATED ORAL DAILY
Status: DISCONTINUED | OUTPATIENT
Start: 2022-09-14 | End: 2022-09-14

## 2022-09-13 RX ORDER — CEFAZOLIN SODIUM/WATER 2 G/20 ML
2000 SYRINGE (ML) INTRAVENOUS ONCE
Status: COMPLETED | OUTPATIENT
Start: 2022-09-13 | End: 2022-09-13

## 2022-09-13 RX ORDER — DEXTROSE MONOHYDRATE 25 G/50ML
12.5 INJECTION, SOLUTION INTRAVENOUS PRN
Status: DISCONTINUED | OUTPATIENT
Start: 2022-09-13 | End: 2022-09-15 | Stop reason: HOSPADM

## 2022-09-13 RX ORDER — ENOXAPARIN SODIUM 100 MG/ML
40 INJECTION SUBCUTANEOUS DAILY
Status: DISCONTINUED | OUTPATIENT
Start: 2022-09-14 | End: 2022-09-14

## 2022-09-13 RX ORDER — NICOTINE POLACRILEX 4 MG
15 LOZENGE BUCCAL PRN
Status: DISCONTINUED | OUTPATIENT
Start: 2022-09-13 | End: 2022-09-15 | Stop reason: HOSPADM

## 2022-09-13 RX ORDER — ACETAMINOPHEN 325 MG/1
650 TABLET ORAL EVERY 4 HOURS PRN
Status: DISCONTINUED | OUTPATIENT
Start: 2022-09-13 | End: 2022-09-15 | Stop reason: HOSPADM

## 2022-09-13 RX ORDER — NICOTINE POLACRILEX 4 MG
30 LOZENGE BUCCAL PRN
Status: DISCONTINUED | OUTPATIENT
Start: 2022-09-13 | End: 2022-09-15 | Stop reason: HOSPADM

## 2022-09-13 RX ORDER — SODIUM CHLORIDE, SODIUM LACTATE, POTASSIUM CHLORIDE, CALCIUM CHLORIDE 600; 310; 30; 20 MG/100ML; MG/100ML; MG/100ML; MG/100ML
INJECTION, SOLUTION INTRAVENOUS CONTINUOUS
Status: DISCONTINUED | OUTPATIENT
Start: 2022-09-13 | End: 2022-09-15 | Stop reason: ALTCHOICE

## 2022-09-13 RX ORDER — CEFAZOLIN SODIUM/WATER 2 G/20 ML
2000 SYRINGE (ML) INTRAVENOUS NIGHTLY
Status: DISCONTINUED | OUTPATIENT
Start: 2022-09-14 | End: 2022-09-15 | Stop reason: HOSPADM

## 2022-09-13 RX ORDER — 0.9 % SODIUM CHLORIDE 0.9 %
2 VIAL (ML) INJECTION EVERY 12 HOURS SCHEDULED
Status: DISCONTINUED | OUTPATIENT
Start: 2022-09-13 | End: 2022-09-15 | Stop reason: HOSPADM

## 2022-09-13 RX ORDER — ONDANSETRON 2 MG/ML
4 INJECTION INTRAMUSCULAR; INTRAVENOUS ONCE
Status: DISCONTINUED | OUTPATIENT
Start: 2022-09-13 | End: 2022-09-15 | Stop reason: HOSPADM

## 2022-09-13 RX ORDER — ONDANSETRON 2 MG/ML
4 INJECTION INTRAMUSCULAR; INTRAVENOUS 2 TIMES DAILY PRN
Status: DISCONTINUED | OUTPATIENT
Start: 2022-09-13 | End: 2022-09-15 | Stop reason: HOSPADM

## 2022-09-13 RX ADMIN — Medication 2000 MG: at 20:55

## 2022-09-13 RX ADMIN — IBUPROFEN 400 MG: 400 TABLET ORAL at 17:44

## 2022-09-13 RX ADMIN — SODIUM CHLORIDE, POTASSIUM CHLORIDE, SODIUM LACTATE AND CALCIUM CHLORIDE: 600; 310; 30; 20 INJECTION, SOLUTION INTRAVENOUS at 23:13

## 2022-09-13 RX ADMIN — IOHEXOL 100 ML: 350 INJECTION, SOLUTION INTRAVENOUS at 19:50

## 2022-09-13 RX ADMIN — SODIUM CHLORIDE 1000 ML: 9 INJECTION, SOLUTION INTRAVENOUS at 17:51

## 2022-09-13 RX ADMIN — SODIUM CHLORIDE 1000 ML: 9 INJECTION, SOLUTION INTRAVENOUS at 20:55

## 2022-09-13 RX ADMIN — METRONIDAZOLE 500 MG: 500 INJECTION, SOLUTION INTRAVENOUS at 20:55

## 2022-09-13 ASSESSMENT — ENCOUNTER SYMPTOMS
WHEEZING: 0
NUMBNESS: 0
CHILLS: 0
COLOR CHANGE: 0
EYE ITCHING: 0
FATIGUE: 0
CHEST TIGHTNESS: 0
BLOOD IN STOOL: 0
SINUS PAIN: 0
DIARRHEA: 0
VOICE CHANGE: 0
BACK PAIN: 0
RECTAL PAIN: 0
SPEECH DIFFICULTY: 0
DIAPHORESIS: 0
NAUSEA: 1
ABDOMINAL DISTENTION: 0
VOMITING: 1
SHORTNESS OF BREATH: 0
SEIZURES: 0
LIGHT-HEADEDNESS: 0
RHINORRHEA: 0
TROUBLE SWALLOWING: 0
SORE THROAT: 0
FEVER: 1
SINUS PRESSURE: 0
EYE PAIN: 0
COUGH: 0
DIZZINESS: 0
FACIAL SWELLING: 0
APPETITE CHANGE: 0
EYE DISCHARGE: 0
CONSTIPATION: 0
STRIDOR: 0
ACTIVITY CHANGE: 0
ABDOMINAL PAIN: 1
FACIAL ASYMMETRY: 0
PHOTOPHOBIA: 0
EYE REDNESS: 0
HEADACHES: 0

## 2022-09-13 ASSESSMENT — PAIN SCALES - GENERAL
PAINLEVEL_OUTOF10: 2
PAINLEVEL_OUTOF10: 3
PAINLEVEL_OUTOF10: 3

## 2022-09-14 LAB
ALBUMIN SERPL-MCNC: 3 G/DL (ref 3.6–5.1)
ALBUMIN/GLOB SERPL: 0.7 {RATIO} (ref 1–2.4)
ALP SERPL-CCNC: 65 UNITS/L (ref 45–117)
ALT SERPL-CCNC: 68 UNITS/L
ANION GAP SERPL CALC-SCNC: 13 MMOL/L (ref 7–19)
AST SERPL-CCNC: 38 UNITS/L
BASOPHILS # BLD: 0 K/MCL (ref 0–0.3)
BASOPHILS NFR BLD: 0 %
BILIRUB SERPL-MCNC: 0.8 MG/DL (ref 0.2–1)
BUN SERPL-MCNC: 8 MG/DL (ref 6–20)
BUN/CREAT SERPL: 11 (ref 7–25)
CALCIUM SERPL-MCNC: 8.2 MG/DL (ref 8.4–10.2)
CHLORIDE SERPL-SCNC: 104 MMOL/L (ref 97–110)
CHOLEST SERPL-MCNC: 140 MG/DL
CHOLEST/HDLC SERPL: 4.1 {RATIO}
CO2 SERPL-SCNC: 25 MMOL/L (ref 21–32)
CREAT SERPL-MCNC: 0.74 MG/DL (ref 0.67–1.17)
DEPRECATED RDW RBC: 40.8 FL (ref 39–50)
EOSINOPHIL # BLD: 0.1 K/MCL (ref 0–0.5)
EOSINOPHIL NFR BLD: 1 %
ERYTHROCYTE [DISTWIDTH] IN BLOOD: 13.3 % (ref 11–15)
FASTING DURATION TIME PATIENT: ABNORMAL H
FASTING DURATION TIME PATIENT: ABNORMAL H
GFR SERPLBLD BASED ON 1.73 SQ M-ARVRAT: >90 ML/MIN
GLOBULIN SER-MCNC: 4.1 G/DL (ref 2–4)
GLUCOSE BLDC GLUCOMTR-MCNC: 105 MG/DL (ref 70–99)
GLUCOSE BLDC GLUCOMTR-MCNC: 80 MG/DL (ref 70–99)
GLUCOSE BLDC GLUCOMTR-MCNC: 86 MG/DL (ref 70–99)
GLUCOSE BLDC GLUCOMTR-MCNC: 86 MG/DL (ref 70–99)
GLUCOSE SERPL-MCNC: 100 MG/DL (ref 70–99)
HBA1C MFR BLD: 5.6 % (ref 4.5–5.6)
HCT VFR BLD CALC: 40.3 % (ref 39–51)
HDLC SERPL-MCNC: 34 MG/DL
HGB BLD-MCNC: 14.2 G/DL (ref 13–17)
IMM GRANULOCYTES # BLD AUTO: 0.1 K/MCL (ref 0–0.2)
IMM GRANULOCYTES # BLD: 1 %
LDLC SERPL CALC-MCNC: 88 MG/DL
LYMPHOCYTES # BLD: 1.8 K/MCL (ref 1–4.8)
LYMPHOCYTES NFR BLD: 14 %
MAGNESIUM SERPL-MCNC: 1.7 MG/DL (ref 1.7–2.4)
MCH RBC QN AUTO: 29.7 PG (ref 26–34)
MCHC RBC AUTO-ENTMCNC: 35.2 G/DL (ref 32–36.5)
MCV RBC AUTO: 84.3 FL (ref 78–100)
MONOCYTES # BLD: 1.2 K/MCL (ref 0.3–0.9)
MONOCYTES NFR BLD: 9 %
NEUTROPHILS # BLD: 9.2 K/MCL (ref 1.8–7.7)
NEUTROPHILS NFR BLD: 75 %
NONHDLC SERPL-MCNC: 106 MG/DL
NRBC BLD MANUAL-RTO: 0 /100 WBC
PHOSPHATE SERPL-MCNC: 3.6 MG/DL (ref 2.4–4.7)
PLATELET # BLD AUTO: 192 K/MCL (ref 140–450)
POTASSIUM SERPL-SCNC: 3.4 MMOL/L (ref 3.4–5.1)
PROT SERPL-MCNC: 7.1 G/DL (ref 6.4–8.2)
RBC # BLD: 4.78 MIL/MCL (ref 4.5–5.9)
SODIUM SERPL-SCNC: 139 MMOL/L (ref 135–145)
TRIGL SERPL-MCNC: 92 MG/DL
WBC # BLD: 12.3 K/MCL (ref 4.2–11)

## 2022-09-14 PROCEDURE — 99221 1ST HOSP IP/OBS SF/LOW 40: CPT | Performed by: SURGERY

## 2022-09-14 PROCEDURE — 10000002 HB ROOM CHARGE MED SURG

## 2022-09-14 PROCEDURE — 83735 ASSAY OF MAGNESIUM: CPT | Performed by: STUDENT IN AN ORGANIZED HEALTH CARE EDUCATION/TRAINING PROGRAM

## 2022-09-14 PROCEDURE — 10002801 HB RX 250 W/O HCPCS: Performed by: STUDENT IN AN ORGANIZED HEALTH CARE EDUCATION/TRAINING PROGRAM

## 2022-09-14 PROCEDURE — 10002803 HB RX 637: Performed by: INTERNAL MEDICINE

## 2022-09-14 PROCEDURE — 36415 COLL VENOUS BLD VENIPUNCTURE: CPT | Performed by: STUDENT IN AN ORGANIZED HEALTH CARE EDUCATION/TRAINING PROGRAM

## 2022-09-14 PROCEDURE — 84100 ASSAY OF PHOSPHORUS: CPT

## 2022-09-14 PROCEDURE — 10004651 HB RX, NO CHARGE ITEM: Performed by: STUDENT IN AN ORGANIZED HEALTH CARE EDUCATION/TRAINING PROGRAM

## 2022-09-14 PROCEDURE — 80061 LIPID PANEL: CPT | Performed by: STUDENT IN AN ORGANIZED HEALTH CARE EDUCATION/TRAINING PROGRAM

## 2022-09-14 PROCEDURE — 10002800 HB RX 250 W HCPCS: Performed by: INTERNAL MEDICINE

## 2022-09-14 PROCEDURE — 99233 SBSQ HOSP IP/OBS HIGH 50: CPT | Performed by: INTERNAL MEDICINE

## 2022-09-14 PROCEDURE — 80053 COMPREHEN METABOLIC PANEL: CPT | Performed by: STUDENT IN AN ORGANIZED HEALTH CARE EDUCATION/TRAINING PROGRAM

## 2022-09-14 PROCEDURE — 10002800 HB RX 250 W HCPCS: Performed by: STUDENT IN AN ORGANIZED HEALTH CARE EDUCATION/TRAINING PROGRAM

## 2022-09-14 PROCEDURE — 85025 COMPLETE CBC W/AUTO DIFF WBC: CPT | Performed by: STUDENT IN AN ORGANIZED HEALTH CARE EDUCATION/TRAINING PROGRAM

## 2022-09-14 RX ORDER — LANOLIN ALCOHOL/MO/W.PET/CERES
400 CREAM (GRAM) TOPICAL ONCE
Status: DISCONTINUED | OUTPATIENT
Start: 2022-09-14 | End: 2022-09-15 | Stop reason: HOSPADM

## 2022-09-14 RX ORDER — LANOLIN ALCOHOL/MO/W.PET/CERES
400 CREAM (GRAM) TOPICAL ONCE
Status: COMPLETED | OUTPATIENT
Start: 2022-09-14 | End: 2022-09-14

## 2022-09-14 RX ORDER — ENOXAPARIN SODIUM 100 MG/ML
60 INJECTION SUBCUTANEOUS EVERY 12 HOURS
Status: DISCONTINUED | OUTPATIENT
Start: 2022-09-14 | End: 2022-09-15 | Stop reason: HOSPADM

## 2022-09-14 RX ADMIN — SODIUM CHLORIDE, PRESERVATIVE FREE 2 ML: 5 INJECTION INTRAVENOUS at 08:13

## 2022-09-14 RX ADMIN — WATER 2000 MG: 1 INJECTION INTRAMUSCULAR; INTRAVENOUS; SUBCUTANEOUS at 23:43

## 2022-09-14 RX ADMIN — ENOXAPARIN SODIUM 40 MG: 40 INJECTION SUBCUTANEOUS at 08:11

## 2022-09-14 RX ADMIN — METRONIDAZOLE 500 MG: 500 INJECTION, SOLUTION INTRAVENOUS at 04:12

## 2022-09-14 RX ADMIN — METRONIDAZOLE 500 MG: 500 INJECTION, SOLUTION INTRAVENOUS at 23:45

## 2022-09-14 RX ADMIN — ENOXAPARIN SODIUM 60 MG: 60 INJECTION SUBCUTANEOUS at 23:44

## 2022-09-14 RX ADMIN — MAGNESIUM OXIDE TAB 400 MG (241.3 MG ELEMENTAL MG) 400 MG: 400 (241.3 MG) TAB at 12:43

## 2022-09-14 RX ADMIN — METRONIDAZOLE 500 MG: 500 INJECTION, SOLUTION INTRAVENOUS at 12:42

## 2022-09-14 RX ADMIN — MORPHINE SULFATE 2 MG: 2 INJECTION, SOLUTION INTRAMUSCULAR; INTRAVENOUS at 04:09

## 2022-09-14 RX ADMIN — SODIUM CHLORIDE, PRESERVATIVE FREE 2 ML: 5 INJECTION INTRAVENOUS at 23:44

## 2022-09-14 ASSESSMENT — ENCOUNTER SYMPTOMS
PSYCHIATRIC NEGATIVE: 1
EYES NEGATIVE: 1
ENDOCRINE NEGATIVE: 1
NEUROLOGICAL NEGATIVE: 1
CONSTITUTIONAL NEGATIVE: 1
RESPIRATORY NEGATIVE: 1

## 2022-09-14 ASSESSMENT — LIFESTYLE VARIABLES
ARE YOU DEAF OR DO YOU HAVE SERIOUS DIFFICULTY  HEARING: NO
CHRONIC/CANCER PAIN PRESENT: NO
RECENTLY LOST WEIGHT WITHOUT TRYING: 0
SHORT OF BREATH OR FATIGUE WITH ADLS: NO
HOW OFTEN DO YOU HAVE A DRINK CONTAINING ALCOHOL: NEVER
ALCOHOL_USE_STATUS: NO OR LOW RISK WITH VALIDATED TOOL
SMOKING_YEARS: NO
LAST DRINK YOU HAD: 49 HOURS OR MORE
ADL NEEDS ASSIST: NO
RECENT DECLINE IN ADLS: NO
ADL BEFORE ADMISSION: INDEPENDENT
HOW MANY STANDARD DRINKS CONTAINING ALCOHOL DO YOU HAVE ON A TYPICAL DAY: 0,1 OR 2
ARE YOU BLIND OR DO YOU HAVE SERIOUS DIFFICULTY SEEING, EVEN WHEN WEARING GLASSES: NO
HOW OFTEN DO YOU HAVE 6 OR MORE DRINKS ON ONE OCCASION: NEVER
AUDIT-C TOTAL SCORE: 0
HISTORY OF PROBLEMS WHEN YOU STOP DRINKING ALCOHOL: NO
HAVE YOU BEEN EATING POORLY BECAUSE OF A DECREASED APPETITE: 1
IS PATIENT ABLE TO COMPLETE ASSESSMENT AT THIS TIME: YES

## 2022-09-14 ASSESSMENT — PATIENT HEALTH QUESTIONNAIRE - PHQ9
CLINICAL INTERPRETATION OF PHQ2 SCORE: NO FURTHER SCREENING NEEDED
IS PATIENT ABLE TO COMPLETE PHQ2 OR PHQ9: YES
SUM OF ALL RESPONSES TO PHQ9 QUESTIONS 1 AND 2: 0

## 2022-09-14 ASSESSMENT — ACTIVITIES OF DAILY LIVING (ADL): ADL_SCORE: 12

## 2022-09-14 ASSESSMENT — PAIN SCALES - GENERAL
PAINLEVEL_OUTOF10: 2
PAINLEVEL_OUTOF10: 6
PAINLEVEL_OUTOF10: 0
PAINLEVEL_OUTOF10: 4
PAINLEVEL_OUTOF10: 0

## 2022-09-14 ASSESSMENT — COGNITIVE AND FUNCTIONAL STATUS - GENERAL
BECAUSE OF A PHYSICAL, MENTAL, OR EMOTIONAL CONDITION, DO YOU HAVE SERIOUS DIFFICULTY CONCENTRATING, REMEMBERING OR MAKING DECISIONS: NO
DO YOU HAVE SERIOUS DIFFICULTY WALKING OR CLIMBING STAIRS: NO
BECAUSE OF A PHYSICAL, MENTAL, OR EMOTIONAL CONDITION, DO YOU HAVE DIFFICULTY DOING ERRANDS ALONE: NO
DO YOU HAVE DIFFICULTY DRESSING OR BATHING: NO

## 2022-09-15 VITALS
SYSTOLIC BLOOD PRESSURE: 110 MMHG | HEIGHT: 69 IN | HEART RATE: 74 BPM | DIASTOLIC BLOOD PRESSURE: 74 MMHG | WEIGHT: 315 LBS | OXYGEN SATURATION: 100 % | TEMPERATURE: 99 F | BODY MASS INDEX: 46.65 KG/M2 | RESPIRATION RATE: 16 BRPM

## 2022-09-15 LAB
ANION GAP SERPL CALC-SCNC: 11 MMOL/L (ref 7–19)
BASOPHILS # BLD: 0.1 K/MCL (ref 0–0.3)
BASOPHILS NFR BLD: 1 %
BUN SERPL-MCNC: 11 MG/DL (ref 6–20)
BUN/CREAT SERPL: 14 (ref 7–25)
CALCIUM SERPL-MCNC: 8.6 MG/DL (ref 8.4–10.2)
CHLORIDE SERPL-SCNC: 102 MMOL/L (ref 97–110)
CO2 SERPL-SCNC: 27 MMOL/L (ref 21–32)
CREAT SERPL-MCNC: 0.8 MG/DL (ref 0.67–1.17)
DEPRECATED RDW RBC: 40.8 FL (ref 39–50)
EOSINOPHIL # BLD: 0.1 K/MCL (ref 0–0.5)
EOSINOPHIL NFR BLD: 1 %
ERYTHROCYTE [DISTWIDTH] IN BLOOD: 13.2 % (ref 11–15)
FASTING DURATION TIME PATIENT: NORMAL H
GFR SERPLBLD BASED ON 1.73 SQ M-ARVRAT: >90 ML/MIN
GLUCOSE BLDC GLUCOMTR-MCNC: 102 MG/DL (ref 70–99)
GLUCOSE BLDC GLUCOMTR-MCNC: 70 MG/DL (ref 70–99)
GLUCOSE BLDC GLUCOMTR-MCNC: 90 MG/DL (ref 70–99)
GLUCOSE SERPL-MCNC: 87 MG/DL (ref 70–99)
HCT VFR BLD CALC: 38.7 % (ref 39–51)
HGB BLD-MCNC: 13.4 G/DL (ref 13–17)
IMM GRANULOCYTES # BLD AUTO: 0 K/MCL (ref 0–0.2)
IMM GRANULOCYTES # BLD: 0 %
LYMPHOCYTES # BLD: 2.5 K/MCL (ref 1–4.8)
LYMPHOCYTES NFR BLD: 24 %
MAGNESIUM SERPL-MCNC: 2 MG/DL (ref 1.7–2.4)
MCH RBC QN AUTO: 29.4 PG (ref 26–34)
MCHC RBC AUTO-ENTMCNC: 34.6 G/DL (ref 32–36.5)
MCV RBC AUTO: 84.9 FL (ref 78–100)
MONOCYTES # BLD: 0.9 K/MCL (ref 0.3–0.9)
MONOCYTES NFR BLD: 9 %
NEUTROPHILS # BLD: 6.8 K/MCL (ref 1.8–7.7)
NEUTROPHILS NFR BLD: 65 %
NRBC BLD MANUAL-RTO: 0 /100 WBC
PHOSPHATE SERPL-MCNC: 4 MG/DL (ref 2.4–4.7)
PLATELET # BLD AUTO: 211 K/MCL (ref 140–450)
POTASSIUM SERPL-SCNC: 4.2 MMOL/L (ref 3.4–5.1)
RBC # BLD: 4.56 MIL/MCL (ref 4.5–5.9)
SODIUM SERPL-SCNC: 136 MMOL/L (ref 135–145)
WBC # BLD: 10.4 K/MCL (ref 4.2–11)

## 2022-09-15 PROCEDURE — 85025 COMPLETE CBC W/AUTO DIFF WBC: CPT | Performed by: PHYSICIAN ASSISTANT

## 2022-09-15 PROCEDURE — 10004651 HB RX, NO CHARGE ITEM: Performed by: STUDENT IN AN ORGANIZED HEALTH CARE EDUCATION/TRAINING PROGRAM

## 2022-09-15 PROCEDURE — 99239 HOSP IP/OBS DSCHRG MGMT >30: CPT | Performed by: INTERNAL MEDICINE

## 2022-09-15 PROCEDURE — 10002801 HB RX 250 W/O HCPCS: Performed by: STUDENT IN AN ORGANIZED HEALTH CARE EDUCATION/TRAINING PROGRAM

## 2022-09-15 PROCEDURE — 84100 ASSAY OF PHOSPHORUS: CPT

## 2022-09-15 PROCEDURE — 10002807 HB RX 258: Performed by: STUDENT IN AN ORGANIZED HEALTH CARE EDUCATION/TRAINING PROGRAM

## 2022-09-15 PROCEDURE — 80048 BASIC METABOLIC PNL TOTAL CA: CPT | Performed by: PHYSICIAN ASSISTANT

## 2022-09-15 PROCEDURE — 83735 ASSAY OF MAGNESIUM: CPT | Performed by: INTERNAL MEDICINE

## 2022-09-15 PROCEDURE — 99233 SBSQ HOSP IP/OBS HIGH 50: CPT | Performed by: SURGERY

## 2022-09-15 PROCEDURE — 10002800 HB RX 250 W HCPCS: Performed by: INTERNAL MEDICINE

## 2022-09-15 PROCEDURE — 36415 COLL VENOUS BLD VENIPUNCTURE: CPT | Performed by: PHYSICIAN ASSISTANT

## 2022-09-15 RX ORDER — AMOXICILLIN AND CLAVULANATE POTASSIUM 875; 125 MG/1; MG/1
1 TABLET, FILM COATED ORAL 2 TIMES DAILY
Qty: 24 TABLET | Refills: 0 | Status: SHIPPED | OUTPATIENT
Start: 2022-09-15 | End: 2022-09-27

## 2022-09-15 RX ADMIN — METRONIDAZOLE 500 MG: 500 INJECTION, SOLUTION INTRAVENOUS at 12:30

## 2022-09-15 RX ADMIN — METRONIDAZOLE 500 MG: 500 INJECTION, SOLUTION INTRAVENOUS at 05:18

## 2022-09-15 RX ADMIN — SODIUM CHLORIDE, POTASSIUM CHLORIDE, SODIUM LACTATE AND CALCIUM CHLORIDE: 600; 310; 30; 20 INJECTION, SOLUTION INTRAVENOUS at 03:58

## 2022-09-15 RX ADMIN — ENOXAPARIN SODIUM 60 MG: 60 INJECTION SUBCUTANEOUS at 09:00

## 2022-09-15 RX ADMIN — SODIUM CHLORIDE, PRESERVATIVE FREE 2 ML: 5 INJECTION INTRAVENOUS at 12:23

## 2022-09-15 ASSESSMENT — PAIN SCALES - GENERAL: PAINLEVEL_OUTOF10: 2

## 2022-09-16 ENCOUNTER — TELEPHONE (OUTPATIENT)
Dept: SURGERY | Age: 42
End: 2022-09-16

## 2022-09-18 LAB
BACTERIA BLD CULT: NORMAL
BACTERIA BLD CULT: NORMAL

## 2022-09-19 ENCOUNTER — TELEPHONE (OUTPATIENT)
Dept: SCHEDULING | Age: 42
End: 2022-09-19

## 2022-09-21 ENCOUNTER — OFFICE VISIT (OUTPATIENT)
Dept: SURGERY | Age: 42
End: 2022-09-21
Attending: SURGERY

## 2022-09-21 ENCOUNTER — TELEPHONE (OUTPATIENT)
Dept: SURGERY | Age: 42
End: 2022-09-21

## 2022-09-21 VITALS
BODY MASS INDEX: 46.65 KG/M2 | HEART RATE: 99 BPM | WEIGHT: 315 LBS | DIASTOLIC BLOOD PRESSURE: 56 MMHG | SYSTOLIC BLOOD PRESSURE: 92 MMHG | HEIGHT: 69 IN | OXYGEN SATURATION: 96 % | RESPIRATION RATE: 16 BRPM | TEMPERATURE: 98.2 F

## 2022-09-21 DIAGNOSIS — K57.32 DIVERTICULITIS OF COLON: Primary | ICD-10-CM

## 2022-09-21 DIAGNOSIS — K57.20 DIVERTICULITIS OF COLON WITH PERFORATION: Primary | ICD-10-CM

## 2022-09-21 DIAGNOSIS — Z78.9 WEIGHT LOSS ADVISED: ICD-10-CM

## 2022-09-21 PROCEDURE — 99211 OFF/OP EST MAY X REQ PHY/QHP: CPT | Performed by: SURGERY

## 2022-09-21 PROCEDURE — 99204 OFFICE O/P NEW MOD 45 MIN: CPT | Performed by: SURGERY

## 2022-09-21 ASSESSMENT — PAIN SCALES - GENERAL: PAINLEVEL: 0

## 2022-09-22 ENCOUNTER — APPOINTMENT (OUTPATIENT)
Dept: INTERNAL MEDICINE | Age: 42
End: 2022-09-22

## 2022-09-23 ENCOUNTER — APPOINTMENT (OUTPATIENT)
Dept: SURGERY | Age: 42
End: 2022-09-23
Attending: SURGERY

## 2022-09-23 DIAGNOSIS — F43.22 ADJUSTMENT DISORDER WITH ANXIOUS MOOD: Primary | ICD-10-CM

## 2022-09-23 RX ORDER — ALPRAZOLAM 0.25 MG/1
0.25 TABLET ORAL 3 TIMES DAILY
Qty: 5 TABLET | Refills: 0 | Status: SHIPPED | OUTPATIENT
Start: 2022-09-23

## 2022-10-03 ENCOUNTER — TELEPHONE (OUTPATIENT)
Dept: SCHEDULING | Age: 42
End: 2022-10-03

## 2022-10-06 ENCOUNTER — OFFICE VISIT (OUTPATIENT)
Dept: INTERNAL MEDICINE | Age: 42
End: 2022-10-06

## 2022-10-06 VITALS
RESPIRATION RATE: 16 BRPM | WEIGHT: 315 LBS | SYSTOLIC BLOOD PRESSURE: 108 MMHG | BODY MASS INDEX: 51.76 KG/M2 | OXYGEN SATURATION: 96 % | DIASTOLIC BLOOD PRESSURE: 72 MMHG | HEART RATE: 77 BPM | TEMPERATURE: 97.1 F

## 2022-10-06 DIAGNOSIS — K57.20 DIVERTICULITIS OF COLON WITH PERFORATION: Primary | ICD-10-CM

## 2022-10-06 DIAGNOSIS — Z23 NEEDS FLU SHOT: ICD-10-CM

## 2022-10-06 DIAGNOSIS — E66.01 MORBID OBESITY (CMD): ICD-10-CM

## 2022-10-06 DIAGNOSIS — Z23 NEED FOR VACCINATION: ICD-10-CM

## 2022-10-06 PROCEDURE — 90471 IMMUNIZATION ADMIN: CPT | Performed by: INTERNAL MEDICINE

## 2022-10-06 PROCEDURE — 90686 IIV4 VACC NO PRSV 0.5 ML IM: CPT | Performed by: INTERNAL MEDICINE

## 2022-10-06 PROCEDURE — 90472 IMMUNIZATION ADMIN EACH ADD: CPT | Performed by: INTERNAL MEDICINE

## 2022-10-06 PROCEDURE — 90677 PCV20 VACCINE IM: CPT | Performed by: INTERNAL MEDICINE

## 2022-10-06 PROCEDURE — 99214 OFFICE O/P EST MOD 30 MIN: CPT | Performed by: INTERNAL MEDICINE

## 2022-10-06 ASSESSMENT — PATIENT HEALTH QUESTIONNAIRE - PHQ9
1. LITTLE INTEREST OR PLEASURE IN DOING THINGS: NOT AT ALL
SUM OF ALL RESPONSES TO PHQ9 QUESTIONS 1 AND 2: 0
2. FEELING DOWN, DEPRESSED OR HOPELESS: NOT AT ALL
CLINICAL INTERPRETATION OF PHQ2 SCORE: NO FURTHER SCREENING NEEDED
SUM OF ALL RESPONSES TO PHQ9 QUESTIONS 1 AND 2: 0

## 2022-10-06 ASSESSMENT — PAIN SCALES - GENERAL: PAINLEVEL: 0

## 2022-11-08 ENCOUNTER — OFFICE VISIT (OUTPATIENT)
Dept: SURGERY | Age: 42
End: 2022-11-08
Attending: SURGERY

## 2022-11-08 VITALS
SYSTOLIC BLOOD PRESSURE: 145 MMHG | RESPIRATION RATE: 16 BRPM | TEMPERATURE: 98.2 F | HEIGHT: 69 IN | DIASTOLIC BLOOD PRESSURE: 99 MMHG | BODY MASS INDEX: 46.65 KG/M2 | HEART RATE: 75 BPM | WEIGHT: 315 LBS | OXYGEN SATURATION: 98 %

## 2022-11-08 DIAGNOSIS — K60.2 ANAL FISSURE: Primary | ICD-10-CM

## 2022-11-08 PROCEDURE — 46600 DIAGNOSTIC ANOSCOPY SPX: CPT | Performed by: SURGERY

## 2022-11-08 PROCEDURE — 99214 OFFICE O/P EST MOD 30 MIN: CPT | Performed by: SURGERY

## 2022-11-08 ASSESSMENT — PAIN SCALES - GENERAL: PAINLEVEL: 0

## 2023-02-07 RX ORDER — TRIAMCINOLONE ACETONIDE 1 MG/G
CREAM TOPICAL DAILY
Qty: 80 G | Refills: 1 | Status: SHIPPED | OUTPATIENT
Start: 2023-02-07

## 2023-03-13 ENCOUNTER — OFFICE VISIT (OUTPATIENT)
Dept: INTERNAL MEDICINE | Age: 43
End: 2023-03-13

## 2023-03-13 ENCOUNTER — LAB SERVICES (OUTPATIENT)
Dept: LAB | Age: 43
End: 2023-03-13

## 2023-03-13 VITALS
BODY MASS INDEX: 46.65 KG/M2 | RESPIRATION RATE: 16 BRPM | HEIGHT: 69 IN | OXYGEN SATURATION: 96 % | DIASTOLIC BLOOD PRESSURE: 74 MMHG | SYSTOLIC BLOOD PRESSURE: 110 MMHG | WEIGHT: 315 LBS | HEART RATE: 81 BPM | TEMPERATURE: 96.1 F

## 2023-03-13 DIAGNOSIS — Z00.00 ANNUAL PHYSICAL EXAM: ICD-10-CM

## 2023-03-13 DIAGNOSIS — E78.5 DYSLIPIDEMIA: ICD-10-CM

## 2023-03-13 DIAGNOSIS — E66.01 MORBID OBESITY (CMD): ICD-10-CM

## 2023-03-13 DIAGNOSIS — R73.03 PREDIABETES: ICD-10-CM

## 2023-03-13 DIAGNOSIS — Z00.00 ANNUAL PHYSICAL EXAM: Primary | ICD-10-CM

## 2023-03-13 LAB
ALBUMIN SERPL-MCNC: 3.5 G/DL (ref 3.6–5.1)
ALBUMIN/GLOB SERPL: 0.8 {RATIO} (ref 1–2.4)
ALP SERPL-CCNC: 87 UNITS/L (ref 45–117)
ALT SERPL-CCNC: 83 UNITS/L
ANION GAP SERPL CALC-SCNC: 8 MMOL/L (ref 7–19)
AST SERPL-CCNC: 54 UNITS/L
BILIRUB SERPL-MCNC: 0.3 MG/DL (ref 0.2–1)
BUN SERPL-MCNC: 7 MG/DL (ref 6–20)
BUN/CREAT SERPL: 10 (ref 7–25)
CALCIUM SERPL-MCNC: 9.2 MG/DL (ref 8.4–10.2)
CHLORIDE SERPL-SCNC: 108 MMOL/L (ref 97–110)
CHOLEST SERPL-MCNC: 167 MG/DL
CHOLEST/HDLC SERPL: 4.4 {RATIO}
CO2 SERPL-SCNC: 27 MMOL/L (ref 21–32)
CREAT SERPL-MCNC: 0.69 MG/DL (ref 0.67–1.17)
FASTING DURATION TIME PATIENT: 12 HOURS (ref 0–999)
FASTING DURATION TIME PATIENT: 12 HOURS (ref 0–999)
GFR SERPLBLD BASED ON 1.73 SQ M-ARVRAT: >90 ML/MIN
GLOBULIN SER-MCNC: 4.2 G/DL (ref 2–4)
GLUCOSE SERPL-MCNC: 86 MG/DL (ref 70–99)
HBA1C MFR BLD: 5.8 % (ref 4.5–5.6)
HCV AB SER QL: NEGATIVE
HDLC SERPL-MCNC: 38 MG/DL
LDLC SERPL CALC-MCNC: 107 MG/DL
NONHDLC SERPL-MCNC: 129 MG/DL
POTASSIUM SERPL-SCNC: 4.3 MMOL/L (ref 3.4–5.1)
PROT SERPL-MCNC: 7.7 G/DL (ref 6.4–8.2)
SODIUM SERPL-SCNC: 139 MMOL/L (ref 135–145)
TRIGL SERPL-MCNC: 109 MG/DL

## 2023-03-13 PROCEDURE — 80061 LIPID PANEL: CPT | Performed by: INTERNAL MEDICINE

## 2023-03-13 PROCEDURE — 99213 OFFICE O/P EST LOW 20 MIN: CPT | Performed by: INTERNAL MEDICINE

## 2023-03-13 PROCEDURE — 99396 PREV VISIT EST AGE 40-64: CPT | Performed by: INTERNAL MEDICINE

## 2023-03-13 PROCEDURE — 83036 HEMOGLOBIN GLYCOSYLATED A1C: CPT | Performed by: INTERNAL MEDICINE

## 2023-03-13 PROCEDURE — 80053 COMPREHEN METABOLIC PANEL: CPT | Performed by: INTERNAL MEDICINE

## 2023-03-13 PROCEDURE — 36415 COLL VENOUS BLD VENIPUNCTURE: CPT | Performed by: INTERNAL MEDICINE

## 2023-03-13 PROCEDURE — 86803 HEPATITIS C AB TEST: CPT | Performed by: INTERNAL MEDICINE

## 2023-03-13 RX ORDER — TIRZEPATIDE 5 MG/.5ML
5 INJECTION, SOLUTION SUBCUTANEOUS
Qty: 2 ML | Refills: 1 | Status: SHIPPED | OUTPATIENT
Start: 2023-03-13 | End: 2023-03-28 | Stop reason: SDUPTHER

## 2023-03-13 ASSESSMENT — PATIENT HEALTH QUESTIONNAIRE - PHQ9
CLINICAL INTERPRETATION OF PHQ2 SCORE: NO FURTHER SCREENING NEEDED
1. LITTLE INTEREST OR PLEASURE IN DOING THINGS: NOT AT ALL
SUM OF ALL RESPONSES TO PHQ9 QUESTIONS 1 AND 2: 0
2. FEELING DOWN, DEPRESSED OR HOPELESS: NOT AT ALL
SUM OF ALL RESPONSES TO PHQ9 QUESTIONS 1 AND 2: 0

## 2023-03-13 ASSESSMENT — PAIN SCALES - GENERAL: PAINLEVEL: 0

## 2023-03-16 ENCOUNTER — TELEPHONE (OUTPATIENT)
Dept: INTERNAL MEDICINE | Age: 43
End: 2023-03-16

## 2023-03-28 ENCOUNTER — TELEPHONE (OUTPATIENT)
Dept: INTERNAL MEDICINE | Age: 43
End: 2023-03-28

## 2023-03-28 DIAGNOSIS — R73.03 PREDIABETES: ICD-10-CM

## 2023-03-28 DIAGNOSIS — E66.01 MORBID OBESITY (CMD): ICD-10-CM

## 2023-03-28 RX ORDER — TIRZEPATIDE 5 MG/.5ML
5 INJECTION, SOLUTION SUBCUTANEOUS
Qty: 2 ML | Refills: 1 | Status: SHIPPED | OUTPATIENT
Start: 2023-03-28 | End: 2023-03-29 | Stop reason: CLARIF

## 2023-03-29 DIAGNOSIS — R73.03 PREDIABETES: ICD-10-CM

## 2023-03-29 DIAGNOSIS — E66.01 MORBID OBESITY (CMD): Primary | ICD-10-CM

## 2023-05-30 DIAGNOSIS — R73.03 PREDIABETES: ICD-10-CM

## 2023-05-30 DIAGNOSIS — E66.01 MORBID OBESITY (CMD): ICD-10-CM

## 2023-07-25 ENCOUNTER — WALK IN (OUTPATIENT)
Dept: URGENT CARE | Age: 43
End: 2023-07-25

## 2023-07-25 VITALS
SYSTOLIC BLOOD PRESSURE: 131 MMHG | HEART RATE: 89 BPM | DIASTOLIC BLOOD PRESSURE: 81 MMHG | TEMPERATURE: 98.6 F | OXYGEN SATURATION: 97 % | RESPIRATION RATE: 16 BRPM

## 2023-07-25 DIAGNOSIS — H61.21 IMPACTED CERUMEN OF RIGHT EAR: ICD-10-CM

## 2023-07-25 DIAGNOSIS — H60.91 OTITIS EXTERNA OF RIGHT EAR, UNSPECIFIED CHRONICITY, UNSPECIFIED TYPE: Primary | ICD-10-CM

## 2023-07-25 PROCEDURE — 99204 OFFICE O/P NEW MOD 45 MIN: CPT | Performed by: NURSE PRACTITIONER

## 2023-07-25 PROCEDURE — 69209 REMOVE IMPACTED EAR WAX UNI: CPT | Performed by: NURSE PRACTITIONER

## 2023-07-25 RX ORDER — NEOMYCIN SULFATE, POLYMYXIN B SULFATE AND HYDROCORTISONE 10; 3.5; 1 MG/ML; MG/ML; [USP'U]/ML
3 SUSPENSION/ DROPS AURICULAR (OTIC) 4 TIMES DAILY
Qty: 10 ML | Refills: 0 | Status: SHIPPED | OUTPATIENT
Start: 2023-07-25 | End: 2023-08-01

## 2023-07-25 ASSESSMENT — ENCOUNTER SYMPTOMS
RESPIRATORY NEGATIVE: 1
PSYCHIATRIC NEGATIVE: 1
CONSTITUTIONAL NEGATIVE: 1
GASTROINTESTINAL NEGATIVE: 1

## 2023-08-28 ENCOUNTER — E-ADVICE (OUTPATIENT)
Dept: INTERNAL MEDICINE | Age: 43
End: 2023-08-28

## 2023-08-28 DIAGNOSIS — R73.03 PREDIABETES: ICD-10-CM

## 2023-08-28 DIAGNOSIS — E66.01 MORBID OBESITY (CMD): ICD-10-CM

## 2023-10-11 ENCOUNTER — OFFICE VISIT (OUTPATIENT)
Dept: INTERNAL MEDICINE | Age: 43
End: 2023-10-11

## 2023-10-11 VITALS
OXYGEN SATURATION: 96 % | HEART RATE: 109 BPM | WEIGHT: 315 LBS | SYSTOLIC BLOOD PRESSURE: 130 MMHG | RESPIRATION RATE: 18 BRPM | TEMPERATURE: 96.5 F | HEIGHT: 69 IN | DIASTOLIC BLOOD PRESSURE: 74 MMHG | BODY MASS INDEX: 46.65 KG/M2

## 2023-10-11 DIAGNOSIS — E66.01 MORBID OBESITY (CMD): ICD-10-CM

## 2023-10-11 DIAGNOSIS — R73.03 PREDIABETES: Primary | ICD-10-CM

## 2023-10-11 DIAGNOSIS — Z23 NEED FOR INFLUENZA VACCINATION: ICD-10-CM

## 2023-10-11 PROCEDURE — 90471 IMMUNIZATION ADMIN: CPT | Performed by: INTERNAL MEDICINE

## 2023-10-11 PROCEDURE — 90686 IIV4 VACC NO PRSV 0.5 ML IM: CPT | Performed by: INTERNAL MEDICINE

## 2023-10-11 PROCEDURE — 99213 OFFICE O/P EST LOW 20 MIN: CPT | Performed by: INTERNAL MEDICINE

## 2023-10-11 ASSESSMENT — ENCOUNTER SYMPTOMS
PSYCHIATRIC NEGATIVE: 1
GASTROINTESTINAL NEGATIVE: 1
RESPIRATORY NEGATIVE: 1
CONSTITUTIONAL NEGATIVE: 1

## 2023-10-11 ASSESSMENT — PATIENT HEALTH QUESTIONNAIRE - PHQ9
SUM OF ALL RESPONSES TO PHQ9 QUESTIONS 1 AND 2: 0
2. FEELING DOWN, DEPRESSED OR HOPELESS: NOT AT ALL
1. LITTLE INTEREST OR PLEASURE IN DOING THINGS: NOT AT ALL
SUM OF ALL RESPONSES TO PHQ9 QUESTIONS 1 AND 2: 0
CLINICAL INTERPRETATION OF PHQ2 SCORE: NO FURTHER SCREENING NEEDED

## 2023-10-11 ASSESSMENT — PAIN SCALES - GENERAL: PAINLEVEL: 0

## 2023-10-17 ENCOUNTER — APPOINTMENT (OUTPATIENT)
Dept: INTERNAL MEDICINE | Age: 43
End: 2023-10-17

## 2023-10-19 ENCOUNTER — APPOINTMENT (OUTPATIENT)
Dept: INTERNAL MEDICINE | Age: 43
End: 2023-10-19

## 2023-11-14 ENCOUNTER — TELEPHONE (OUTPATIENT)
Dept: INTERNAL MEDICINE | Age: 43
End: 2023-11-14

## 2023-11-14 DIAGNOSIS — R73.03 PREDIABETES: ICD-10-CM

## 2023-11-14 DIAGNOSIS — E66.01 MORBID OBESITY (CMD): ICD-10-CM

## 2023-11-16 ENCOUNTER — TELEPHONE (OUTPATIENT)
Dept: INTERNAL MEDICINE | Age: 43
End: 2023-11-16

## 2023-11-29 ENCOUNTER — TELEPHONE (OUTPATIENT)
Dept: INTERNAL MEDICINE | Age: 43
End: 2023-11-29

## 2023-11-29 ENCOUNTER — OFFICE VISIT (OUTPATIENT)
Dept: INTERNAL MEDICINE | Age: 43
End: 2023-11-29

## 2023-11-29 VITALS
DIASTOLIC BLOOD PRESSURE: 77 MMHG | SYSTOLIC BLOOD PRESSURE: 118 MMHG | TEMPERATURE: 98.2 F | HEIGHT: 68 IN | HEART RATE: 81 BPM | WEIGHT: 315 LBS | RESPIRATION RATE: 16 BRPM | BODY MASS INDEX: 47.74 KG/M2 | OXYGEN SATURATION: 95 %

## 2023-11-29 DIAGNOSIS — E66.01 MORBID OBESITY (CMD): ICD-10-CM

## 2023-11-29 DIAGNOSIS — E11.69 TYPE 2 DIABETES MELLITUS WITH OTHER SPECIFIED COMPLICATION, WITHOUT LONG-TERM CURRENT USE OF INSULIN (CMD): Primary | ICD-10-CM

## 2023-11-29 PROCEDURE — 3078F DIAST BP <80 MM HG: CPT | Performed by: INTERNAL MEDICINE

## 2023-11-29 PROCEDURE — 99213 OFFICE O/P EST LOW 20 MIN: CPT | Performed by: INTERNAL MEDICINE

## 2023-11-29 PROCEDURE — 3074F SYST BP LT 130 MM HG: CPT | Performed by: INTERNAL MEDICINE

## 2023-11-29 ASSESSMENT — ENCOUNTER SYMPTOMS
RESPIRATORY NEGATIVE: 1
CONSTITUTIONAL NEGATIVE: 1
DIARRHEA: 1

## 2023-11-29 ASSESSMENT — PATIENT HEALTH QUESTIONNAIRE - PHQ9
1. LITTLE INTEREST OR PLEASURE IN DOING THINGS: NOT AT ALL
CLINICAL INTERPRETATION OF PHQ2 SCORE: NO FURTHER SCREENING NEEDED
2. FEELING DOWN, DEPRESSED OR HOPELESS: NOT AT ALL
SUM OF ALL RESPONSES TO PHQ9 QUESTIONS 1 AND 2: 0
SUM OF ALL RESPONSES TO PHQ9 QUESTIONS 1 AND 2: 0

## 2023-11-30 ENCOUNTER — LAB SERVICES (OUTPATIENT)
Dept: LAB | Age: 43
End: 2023-11-30

## 2023-11-30 DIAGNOSIS — E11.69 TYPE 2 DIABETES MELLITUS WITH OTHER SPECIFIED COMPLICATION, WITHOUT LONG-TERM CURRENT USE OF INSULIN (CMD): ICD-10-CM

## 2023-11-30 LAB
ALBUMIN SERPL-MCNC: 3.9 G/DL (ref 3.6–5.1)
ALBUMIN/GLOB SERPL: 0.9 {RATIO} (ref 1–2.4)
ALP SERPL-CCNC: 77 UNITS/L (ref 45–117)
ALT SERPL-CCNC: 64 UNITS/L
ANION GAP SERPL CALC-SCNC: 12 MMOL/L (ref 7–19)
AST SERPL-CCNC: 51 UNITS/L
BILIRUB SERPL-MCNC: 0.6 MG/DL (ref 0.2–1)
BUN SERPL-MCNC: 12 MG/DL (ref 6–20)
BUN/CREAT SERPL: 13 (ref 7–25)
CALCIUM SERPL-MCNC: 9.6 MG/DL (ref 8.4–10.2)
CHLORIDE SERPL-SCNC: 105 MMOL/L (ref 97–110)
CHOLEST SERPL-MCNC: 167 MG/DL
CHOLEST/HDLC SERPL: 4.6 {RATIO}
CO2 SERPL-SCNC: 27 MMOL/L (ref 21–32)
CREAT SERPL-MCNC: 0.9 MG/DL (ref 0.67–1.17)
EGFRCR SERPLBLD CKD-EPI 2021: >90 ML/MIN/{1.73_M2}
FASTING DURATION TIME PATIENT: 12 HOURS (ref 0–999)
GLOBULIN SER-MCNC: 4.3 G/DL (ref 2–4)
GLUCOSE SERPL-MCNC: 82 MG/DL (ref 70–99)
HBA1C MFR BLD: 5 % (ref 4.5–5.6)
HDLC SERPL-MCNC: 36 MG/DL
LDLC SERPL CALC-MCNC: 107 MG/DL
NONHDLC SERPL-MCNC: 131 MG/DL
POTASSIUM SERPL-SCNC: 4.5 MMOL/L (ref 3.4–5.1)
PROT SERPL-MCNC: 8.2 G/DL (ref 6.4–8.2)
SODIUM SERPL-SCNC: 139 MMOL/L (ref 135–145)
TRIGL SERPL-MCNC: 121 MG/DL

## 2023-11-30 PROCEDURE — 83036 HEMOGLOBIN GLYCOSYLATED A1C: CPT | Performed by: CLINICAL MEDICAL LABORATORY

## 2023-11-30 PROCEDURE — 80053 COMPREHEN METABOLIC PANEL: CPT | Performed by: CLINICAL MEDICAL LABORATORY

## 2023-11-30 PROCEDURE — 36415 COLL VENOUS BLD VENIPUNCTURE: CPT | Performed by: INTERNAL MEDICINE

## 2023-11-30 PROCEDURE — 80061 LIPID PANEL: CPT | Performed by: CLINICAL MEDICAL LABORATORY

## 2023-12-01 ENCOUNTER — APPOINTMENT (OUTPATIENT)
Dept: INTERNAL MEDICINE | Age: 43
End: 2023-12-01

## 2023-12-10 ENCOUNTER — E-ADVICE (OUTPATIENT)
Dept: INTERNAL MEDICINE | Age: 43
End: 2023-12-10

## 2023-12-10 DIAGNOSIS — E66.01 MORBID OBESITY (CMD): ICD-10-CM

## 2023-12-10 DIAGNOSIS — R73.03 PREDIABETES: ICD-10-CM

## 2024-01-05 DIAGNOSIS — E66.01 MORBID OBESITY (CMD): ICD-10-CM

## 2024-01-05 DIAGNOSIS — R73.03 PREDIABETES: ICD-10-CM

## 2024-01-11 ENCOUNTER — TELEPHONE (OUTPATIENT)
Dept: INTERNAL MEDICINE | Age: 44
End: 2024-01-11

## 2024-01-11 DIAGNOSIS — E66.01 MORBID OBESITY (CMD): ICD-10-CM

## 2024-01-11 DIAGNOSIS — R73.03 PREDIABETES: ICD-10-CM

## 2024-02-07 ENCOUNTER — APPOINTMENT (OUTPATIENT)
Dept: INTERNAL MEDICINE | Age: 44
End: 2024-02-07

## 2024-02-07 VITALS
RESPIRATION RATE: 14 BRPM | HEART RATE: 68 BPM | SYSTOLIC BLOOD PRESSURE: 110 MMHG | HEIGHT: 68 IN | OXYGEN SATURATION: 96 % | WEIGHT: 315 LBS | DIASTOLIC BLOOD PRESSURE: 80 MMHG | BODY MASS INDEX: 47.74 KG/M2 | TEMPERATURE: 96.9 F

## 2024-02-07 DIAGNOSIS — E66.01 MORBID OBESITY (CMD): Primary | ICD-10-CM

## 2024-02-07 DIAGNOSIS — R73.03 PREDIABETES: ICD-10-CM

## 2024-02-07 PROCEDURE — 99213 OFFICE O/P EST LOW 20 MIN: CPT | Performed by: INTERNAL MEDICINE

## 2024-02-07 ASSESSMENT — ENCOUNTER SYMPTOMS
DIARRHEA: 1
CONSTITUTIONAL NEGATIVE: 1
RESPIRATORY NEGATIVE: 1

## 2024-03-04 DIAGNOSIS — E66.01 MORBID OBESITY (CMD): ICD-10-CM

## 2024-03-04 DIAGNOSIS — R73.03 PREDIABETES: ICD-10-CM

## 2024-03-04 RX ORDER — TRIAMCINOLONE ACETONIDE 1 MG/G
CREAM TOPICAL DAILY
Qty: 80 G | Refills: 1 | Status: SHIPPED | OUTPATIENT
Start: 2024-03-04 | End: 2024-03-08 | Stop reason: SDUPTHER

## 2024-03-08 ENCOUNTER — TELEPHONE (OUTPATIENT)
Dept: INTERNAL MEDICINE | Age: 44
End: 2024-03-08

## 2024-03-08 RX ORDER — TRIAMCINOLONE ACETONIDE 1 MG/G
CREAM TOPICAL DAILY
Qty: 80 G | Refills: 1 | Status: SHIPPED | OUTPATIENT
Start: 2024-03-08

## 2024-04-03 DIAGNOSIS — R73.03 PREDIABETES: ICD-10-CM

## 2024-04-03 DIAGNOSIS — E66.01 MORBID OBESITY  (CMD): ICD-10-CM

## 2024-05-01 DIAGNOSIS — E66.01 MORBID OBESITY  (CMD): ICD-10-CM

## 2024-05-01 DIAGNOSIS — R73.03 PREDIABETES: ICD-10-CM

## 2024-08-27 DIAGNOSIS — R73.03 PREDIABETES: ICD-10-CM

## 2024-08-27 DIAGNOSIS — E66.01 MORBID OBESITY  (CMD): ICD-10-CM

## 2024-08-27 RX ORDER — TRIAMCINOLONE ACETONIDE 1 MG/G
CREAM TOPICAL DAILY
Qty: 80 G | Refills: 1 | Status: SHIPPED | OUTPATIENT
Start: 2024-08-27

## 2024-09-10 ENCOUNTER — APPOINTMENT (OUTPATIENT)
Dept: INTERNAL MEDICINE | Age: 44
End: 2024-09-10

## 2024-09-12 ENCOUNTER — OFFICE VISIT (OUTPATIENT)
Dept: INTERNAL MEDICINE | Age: 44
End: 2024-09-12

## 2024-09-12 VITALS
SYSTOLIC BLOOD PRESSURE: 119 MMHG | BODY MASS INDEX: 47.74 KG/M2 | OXYGEN SATURATION: 97 % | HEIGHT: 68 IN | TEMPERATURE: 97.6 F | HEART RATE: 78 BPM | DIASTOLIC BLOOD PRESSURE: 71 MMHG | WEIGHT: 315 LBS | RESPIRATION RATE: 18 BRPM

## 2024-09-12 DIAGNOSIS — E11.69 TYPE 2 DIABETES MELLITUS WITH OTHER SPECIFIED COMPLICATION, WITHOUT LONG-TERM CURRENT USE OF INSULIN  (CMD): ICD-10-CM

## 2024-09-12 DIAGNOSIS — L82.1 SEBORRHEIC KERATOSIS: Primary | ICD-10-CM

## 2024-09-12 DIAGNOSIS — Z23 INFLUENZA VACCINE NEEDED: ICD-10-CM

## 2024-09-12 LAB — HBA1C MFR BLD: 5.4 % (ref 4.5–5.6)

## 2024-09-12 RX ORDER — SEMAGLUTIDE 2.68 MG/ML
2 INJECTION, SOLUTION SUBCUTANEOUS
Qty: 3 ML | Refills: 3 | Status: SHIPPED | OUTPATIENT
Start: 2024-09-12

## 2024-09-12 ASSESSMENT — PATIENT HEALTH QUESTIONNAIRE - PHQ9
1. LITTLE INTEREST OR PLEASURE IN DOING THINGS: NOT AT ALL
SUM OF ALL RESPONSES TO PHQ9 QUESTIONS 1 AND 2: 0
2. FEELING DOWN, DEPRESSED OR HOPELESS: NOT AT ALL
SUM OF ALL RESPONSES TO PHQ9 QUESTIONS 1 AND 2: 0
CLINICAL INTERPRETATION OF PHQ2 SCORE: NO FURTHER SCREENING NEEDED

## 2024-09-12 ASSESSMENT — ENCOUNTER SYMPTOMS
ROS SKIN COMMENTS: SKIN GROWTH
RESPIRATORY NEGATIVE: 1
CONSTITUTIONAL NEGATIVE: 1
GASTROINTESTINAL NEGATIVE: 1

## 2024-09-19 ENCOUNTER — E-ADVICE (OUTPATIENT)
Dept: SURGERY | Age: 44
End: 2024-09-19

## 2024-09-24 ENCOUNTER — TELEPHONE (OUTPATIENT)
Dept: SURGERY | Age: 44
End: 2024-09-24

## 2024-10-30 ENCOUNTER — LAB SERVICES (OUTPATIENT)
Dept: LAB | Age: 44
End: 2024-10-30

## 2024-10-30 DIAGNOSIS — E11.69 TYPE 2 DIABETES MELLITUS WITH OTHER SPECIFIED COMPLICATION, WITHOUT LONG-TERM CURRENT USE OF INSULIN (CMD): ICD-10-CM

## 2024-10-30 LAB
ALBUMIN SERPL-MCNC: 3.8 G/DL (ref 3.4–5)
ALBUMIN/GLOB SERPL: 1 {RATIO} (ref 1–2.4)
ALP SERPL-CCNC: 76 UNITS/L (ref 45–117)
ALT SERPL-CCNC: 55 UNITS/L
ANION GAP SERPL CALC-SCNC: 9 MMOL/L (ref 7–19)
AST SERPL-CCNC: 42 UNITS/L
BILIRUB SERPL-MCNC: 0.5 MG/DL (ref 0.2–1)
BUN SERPL-MCNC: 14 MG/DL (ref 6–20)
BUN/CREAT SERPL: 15 (ref 7–25)
CALCIUM SERPL-MCNC: 9.8 MG/DL (ref 8.4–10.2)
CHLORIDE SERPL-SCNC: 107 MMOL/L (ref 97–110)
CHOLEST SERPL-MCNC: 175 MG/DL
CHOLEST/HDLC SERPL: 4.2 {RATIO}
CO2 SERPL-SCNC: 28 MMOL/L (ref 21–32)
CREAT SERPL-MCNC: 0.93 MG/DL (ref 0.67–1.17)
CREAT UR-MCNC: 212 MG/DL
EGFRCR SERPLBLD CKD-EPI 2021: >90 ML/MIN/{1.73_M2}
FASTING DURATION TIME PATIENT: 12 HOURS (ref 0–999)
GLOBULIN SER-MCNC: 4 G/DL (ref 2–4)
GLUCOSE SERPL-MCNC: 86 MG/DL (ref 70–99)
HDLC SERPL-MCNC: 42 MG/DL
LDLC SERPL CALC-MCNC: 105 MG/DL
MICROALBUMIN UR-MCNC: 1.52 MG/DL
MICROALBUMIN/CREAT UR: 7.2 MG/G
NONHDLC SERPL-MCNC: 133 MG/DL
POTASSIUM SERPL-SCNC: 4.7 MMOL/L (ref 3.4–5.1)
PROT SERPL-MCNC: 7.8 G/DL (ref 6.4–8.2)
SODIUM SERPL-SCNC: 139 MMOL/L (ref 135–145)
TRIGL SERPL-MCNC: 140 MG/DL

## 2024-10-30 PROCEDURE — 36415 COLL VENOUS BLD VENIPUNCTURE: CPT | Performed by: INTERNAL MEDICINE

## 2024-10-30 PROCEDURE — 80061 LIPID PANEL: CPT | Performed by: CLINICAL MEDICAL LABORATORY

## 2024-10-30 PROCEDURE — 80053 COMPREHEN METABOLIC PANEL: CPT | Performed by: CLINICAL MEDICAL LABORATORY

## 2024-11-29 ENCOUNTER — TELEPHONE (OUTPATIENT)
Dept: URGENT CARE | Age: 44
End: 2024-11-29

## 2024-12-03 DIAGNOSIS — R73.03 PREDIABETES: ICD-10-CM

## 2024-12-03 DIAGNOSIS — E66.01 MORBID OBESITY  (CMD): ICD-10-CM

## 2024-12-03 RX ORDER — SEMAGLUTIDE 1.34 MG/ML
INJECTION, SOLUTION SUBCUTANEOUS
Qty: 2 ML | Refills: 3 | Status: SHIPPED | OUTPATIENT
Start: 2024-12-03

## 2024-12-04 ENCOUNTER — CLINICAL DOCUMENTATION (OUTPATIENT)
Dept: INTERNAL MEDICINE | Age: 44
End: 2024-12-04

## 2024-12-06 ENCOUNTER — TELEPHONE (OUTPATIENT)
Dept: INTERNAL MEDICINE | Age: 44
End: 2024-12-06

## 2025-02-19 SDOH — ECONOMIC STABILITY: FOOD INSECURITY: WITHIN THE PAST 12 MONTHS, THE FOOD YOU BOUGHT JUST DIDN'T LAST AND YOU DIDN'T HAVE MONEY TO GET MORE.: PATIENT DECLINED

## 2025-02-19 SDOH — ECONOMIC STABILITY: HOUSING INSECURITY: DO YOU HAVE PROBLEMS WITH ANY OF THE FOLLOWING?: PATIENT DECLINED

## 2025-02-19 SDOH — ECONOMIC STABILITY: HOUSING INSECURITY: WHAT IS YOUR LIVING SITUATION TODAY?: I HAVE A STEADY PLACE TO LIVE

## 2025-02-19 SDOH — ECONOMIC STABILITY: TRANSPORTATION INSECURITY
IN THE PAST 12 MONTHS, HAS LACK OF RELIABLE TRANSPORTATION KEPT YOU FROM MEDICAL APPOINTMENTS, MEETINGS, WORK OR FROM GETTING THINGS NEEDED FOR DAILY LIVING?: NO

## 2025-02-19 SDOH — ECONOMIC STABILITY: GENERAL: WOULD YOU LIKE HELP WITH ANY OF THE FOLLOWING NEEDS?: I DON'T WANT HELP WITH ANY OF THESE

## 2025-02-19 ASSESSMENT — SOCIAL DETERMINANTS OF HEALTH (SDOH): IN THE PAST 12 MONTHS, HAS THE ELECTRIC, GAS, OIL, OR WATER COMPANY THREATENED TO SHUT OFF SERVICE IN YOUR HOME?: NO

## 2025-02-21 ENCOUNTER — E-ADVICE (OUTPATIENT)
Dept: INTERNAL MEDICINE | Age: 45
End: 2025-02-21

## 2025-02-21 ENCOUNTER — LAB SERVICES (OUTPATIENT)
Dept: LAB | Age: 45
End: 2025-02-21

## 2025-02-21 ENCOUNTER — APPOINTMENT (OUTPATIENT)
Dept: INTERNAL MEDICINE | Age: 45
End: 2025-02-21

## 2025-02-21 VITALS
RESPIRATION RATE: 18 BRPM | BODY MASS INDEX: 47.74 KG/M2 | SYSTOLIC BLOOD PRESSURE: 120 MMHG | HEIGHT: 68 IN | OXYGEN SATURATION: 97 % | TEMPERATURE: 97.4 F | HEART RATE: 77 BPM | DIASTOLIC BLOOD PRESSURE: 77 MMHG | WEIGHT: 315 LBS

## 2025-02-21 DIAGNOSIS — Z00.00 ANNUAL PHYSICAL EXAM: Primary | ICD-10-CM

## 2025-02-21 DIAGNOSIS — E66.01 MORBID OBESITY  (CMD): ICD-10-CM

## 2025-02-21 DIAGNOSIS — E11.69 TYPE 2 DIABETES MELLITUS WITH OTHER SPECIFIED COMPLICATION, WITHOUT LONG-TERM CURRENT USE OF INSULIN (CMD): ICD-10-CM

## 2025-02-21 PROBLEM — A41.9 SEPSIS  (CMD): Status: RESOLVED | Noted: 2022-09-13 | Resolved: 2025-02-21

## 2025-02-21 LAB
ALBUMIN SERPL-MCNC: 3.6 G/DL (ref 3.4–5)
ALBUMIN/GLOB SERPL: 0.9 {RATIO} (ref 1–2.4)
ALP SERPL-CCNC: 76 UNITS/L (ref 45–117)
ALT SERPL-CCNC: 61 UNITS/L
ANION GAP SERPL CALC-SCNC: 8 MMOL/L (ref 7–19)
AST SERPL-CCNC: 39 UNITS/L
BILIRUB SERPL-MCNC: 0.3 MG/DL (ref 0.2–1)
BUN SERPL-MCNC: 12 MG/DL (ref 6–20)
BUN/CREAT SERPL: 14 (ref 7–25)
CALCIUM SERPL-MCNC: 9.1 MG/DL (ref 8.4–10.2)
CHLORIDE SERPL-SCNC: 107 MMOL/L (ref 97–110)
CHOLEST SERPL-MCNC: 164 MG/DL
CHOLEST/HDLC SERPL: 4.7 {RATIO}
CO2 SERPL-SCNC: 29 MMOL/L (ref 21–32)
CREAT SERPL-MCNC: 0.87 MG/DL (ref 0.67–1.17)
EGFRCR SERPLBLD CKD-EPI 2021: >90 ML/MIN/{1.73_M2}
FASTING DURATION TIME PATIENT: 12 HOURS (ref 0–999)
GLOBULIN SER-MCNC: 3.9 G/DL (ref 2–4)
GLUCOSE SERPL-MCNC: 92 MG/DL (ref 70–99)
HBA1C MFR BLD: 5.3 % (ref 4.5–5.6)
HDLC SERPL-MCNC: 35 MG/DL
LDLC SERPL CALC-MCNC: 96 MG/DL
NONHDLC SERPL-MCNC: 129 MG/DL
POTASSIUM SERPL-SCNC: 4.4 MMOL/L (ref 3.4–5.1)
PROT SERPL-MCNC: 7.5 G/DL (ref 6.4–8.2)
SODIUM SERPL-SCNC: 140 MMOL/L (ref 135–145)
TRIGL SERPL-MCNC: 166 MG/DL

## 2025-02-21 PROCEDURE — 80053 COMPREHEN METABOLIC PANEL: CPT | Performed by: CLINICAL MEDICAL LABORATORY

## 2025-02-21 PROCEDURE — 80061 LIPID PANEL: CPT | Performed by: CLINICAL MEDICAL LABORATORY

## 2025-02-21 RX ORDER — SEMAGLUTIDE 2.68 MG/ML
2 INJECTION, SOLUTION SUBCUTANEOUS
Qty: 3 ML | Refills: 3 | Status: SHIPPED | OUTPATIENT
Start: 2025-02-21

## 2025-02-21 ASSESSMENT — PATIENT HEALTH QUESTIONNAIRE - PHQ9
2. FEELING DOWN, DEPRESSED OR HOPELESS: NOT AT ALL
CLINICAL INTERPRETATION OF PHQ2 SCORE: NO FURTHER SCREENING NEEDED
SUM OF ALL RESPONSES TO PHQ9 QUESTIONS 1 AND 2: 0
1. LITTLE INTEREST OR PLEASURE IN DOING THINGS: NOT AT ALL
SUM OF ALL RESPONSES TO PHQ9 QUESTIONS 1 AND 2: 0

## 2025-02-22 LAB
CREAT UR-MCNC: 263 MG/DL
MICROALBUMIN UR-MCNC: 3.37 MG/DL
MICROALBUMIN/CREAT UR: 12.8 MG/G

## 2025-02-26 ENCOUNTER — E-ADVICE (OUTPATIENT)
Dept: SURGERY | Age: 45
End: 2025-02-26

## 2025-03-05 ENCOUNTER — TELEPHONE (OUTPATIENT)
Age: 45
End: 2025-03-05

## 2025-03-07 ENCOUNTER — TELEPHONE (OUTPATIENT)
Age: 45
End: 2025-03-07

## 2025-03-28 ENCOUNTER — APPOINTMENT (OUTPATIENT)
Dept: INTERNAL MEDICINE | Age: 45
End: 2025-03-28

## 2025-03-28 VITALS
RESPIRATION RATE: 16 BRPM | WEIGHT: 315 LBS | DIASTOLIC BLOOD PRESSURE: 68 MMHG | HEIGHT: 68 IN | HEART RATE: 82 BPM | TEMPERATURE: 97.5 F | BODY MASS INDEX: 47.74 KG/M2 | OXYGEN SATURATION: 97 % | SYSTOLIC BLOOD PRESSURE: 109 MMHG

## 2025-03-28 DIAGNOSIS — L30.4 INTERTRIGINOUS DERMATITIS ASSOCIATED WITH MOISTURE: ICD-10-CM

## 2025-03-28 DIAGNOSIS — E11.69 TYPE 2 DIABETES MELLITUS WITH OTHER SPECIFIED COMPLICATION, WITHOUT LONG-TERM CURRENT USE OF INSULIN (CMD): Primary | ICD-10-CM

## 2025-03-28 DIAGNOSIS — E66.01 MORBID OBESITY  (CMD): ICD-10-CM

## 2025-03-28 PROCEDURE — 3078F DIAST BP <80 MM HG: CPT | Performed by: INTERNAL MEDICINE

## 2025-03-28 PROCEDURE — 99213 OFFICE O/P EST LOW 20 MIN: CPT | Performed by: INTERNAL MEDICINE

## 2025-03-28 PROCEDURE — 3074F SYST BP LT 130 MM HG: CPT | Performed by: INTERNAL MEDICINE

## 2025-03-28 RX ORDER — TRIAMCINOLONE ACETONIDE 1 MG/G
CREAM TOPICAL DAILY
Qty: 80 G | Refills: 3 | Status: SHIPPED | OUTPATIENT
Start: 2025-03-28

## 2025-03-28 RX ORDER — TIRZEPATIDE 10 MG/.5ML
10 INJECTION, SOLUTION SUBCUTANEOUS
Qty: 2 ML | Refills: 0 | Status: SHIPPED | OUTPATIENT
Start: 2025-03-28

## 2025-03-28 ASSESSMENT — ENCOUNTER SYMPTOMS
CONSTITUTIONAL NEGATIVE: 1
GASTROINTESTINAL NEGATIVE: 1
RESPIRATORY NEGATIVE: 1

## 2025-03-28 ASSESSMENT — PATIENT HEALTH QUESTIONNAIRE - PHQ9
1. LITTLE INTEREST OR PLEASURE IN DOING THINGS: NOT AT ALL
2. FEELING DOWN, DEPRESSED OR HOPELESS: NOT AT ALL
SUM OF ALL RESPONSES TO PHQ9 QUESTIONS 1 AND 2: 0
CLINICAL INTERPRETATION OF PHQ2 SCORE: NO FURTHER SCREENING NEEDED
SUM OF ALL RESPONSES TO PHQ9 QUESTIONS 1 AND 2: 0

## 2025-08-02 ENCOUNTER — E-ADVICE (OUTPATIENT)
Dept: INTERNAL MEDICINE | Age: 45
End: 2025-08-02

## 2025-08-05 DIAGNOSIS — E11.69 TYPE 2 DIABETES MELLITUS WITH OTHER SPECIFIED COMPLICATION, WITHOUT LONG-TERM CURRENT USE OF INSULIN (CMD): Primary | ICD-10-CM

## 2025-08-05 RX ORDER — SEMAGLUTIDE 2.68 MG/ML
2 INJECTION, SOLUTION SUBCUTANEOUS
Qty: 3 ML | Refills: 3 | Status: SHIPPED | OUTPATIENT
Start: 2025-08-05

## (undated) NOTE — ED AVS SNAPSHOT
Jaden Christianson   MRN: [id number]    Department:  [facility] Emergency Department   Date of Visit:  5/9/2019           Disclosure     Insurance plans vary and the physician(s) referred by the ER may not be covered by your plan.  Please contact CARE PHYSICIAN AT ONCE OR RETURN IMMEDIATELY TO THE EMERGENCY DEPARTMENT. If you have been prescribed any medication(s), please fill your prescription right away and begin taking the medication(s) as directed.   If you believe that any of the medications